# Patient Record
Sex: MALE | Race: WHITE | Employment: OTHER | URBAN - METROPOLITAN AREA
[De-identification: names, ages, dates, MRNs, and addresses within clinical notes are randomized per-mention and may not be internally consistent; named-entity substitution may affect disease eponyms.]

---

## 2017-08-30 ENCOUNTER — HOSPITAL ENCOUNTER (INPATIENT)
Age: 72
LOS: 6 days | Discharge: HOME | DRG: 309 | End: 2017-09-06
Attending: STUDENT IN AN ORGANIZED HEALTH CARE EDUCATION/TRAINING PROGRAM | Admitting: FAMILY MEDICINE
Payer: MEDICARE

## 2017-08-30 ENCOUNTER — APPOINTMENT (OUTPATIENT)
Dept: GENERAL RADIOLOGY | Age: 72
DRG: 309 | End: 2017-08-30
Attending: STUDENT IN AN ORGANIZED HEALTH CARE EDUCATION/TRAINING PROGRAM
Payer: MEDICARE

## 2017-08-30 DIAGNOSIS — R26.81 GAIT INSTABILITY: ICD-10-CM

## 2017-08-30 DIAGNOSIS — R42 ORTHOSTATIC DIZZINESS: Primary | ICD-10-CM

## 2017-08-30 LAB
ALBUMIN SERPL-MCNC: 3.3 G/DL (ref 3.5–5)
ALBUMIN/GLOB SERPL: 0.8 {RATIO} (ref 1.1–2.2)
ALP SERPL-CCNC: 90 U/L (ref 45–117)
ALT SERPL-CCNC: 17 U/L (ref 12–78)
ANION GAP SERPL CALC-SCNC: 7 MMOL/L (ref 5–15)
APPEARANCE UR: CLEAR
AST SERPL-CCNC: 34 U/L (ref 15–37)
BACTERIA URNS QL MICRO: NEGATIVE /HPF
BASOPHILS # BLD: 0 K/UL (ref 0–0.1)
BASOPHILS NFR BLD: 0 % (ref 0–1)
BILIRUB SERPL-MCNC: 0.4 MG/DL (ref 0.2–1)
BILIRUB UR QL: NEGATIVE
BUN SERPL-MCNC: 15 MG/DL (ref 6–20)
BUN/CREAT SERPL: 13 (ref 12–20)
CALCIUM SERPL-MCNC: 7.7 MG/DL (ref 8.5–10.1)
CHLORIDE SERPL-SCNC: 107 MMOL/L (ref 97–108)
CK SERPL-CCNC: 114 U/L (ref 39–308)
CO2 SERPL-SCNC: 24 MMOL/L (ref 21–32)
COLOR UR: ABNORMAL
CREAT SERPL-MCNC: 1.19 MG/DL (ref 0.7–1.3)
EOSINOPHIL # BLD: 0.5 K/UL (ref 0–0.4)
EOSINOPHIL NFR BLD: 8 % (ref 0–7)
EPITH CASTS URNS QL MICRO: ABNORMAL /LPF
ERYTHROCYTE [DISTWIDTH] IN BLOOD BY AUTOMATED COUNT: 14.8 % (ref 11.5–14.5)
GLOBULIN SER CALC-MCNC: 4.4 G/DL (ref 2–4)
GLUCOSE SERPL-MCNC: 91 MG/DL (ref 65–100)
GLUCOSE UR STRIP.AUTO-MCNC: NEGATIVE MG/DL
HCT VFR BLD AUTO: 39.2 % (ref 36.6–50.3)
HGB BLD-MCNC: 12.9 G/DL (ref 12.1–17)
HGB UR QL STRIP: ABNORMAL
KETONES UR QL STRIP.AUTO: NEGATIVE MG/DL
LACTATE SERPL-SCNC: 1.5 MMOL/L (ref 0.4–2)
LEUKOCYTE ESTERASE UR QL STRIP.AUTO: NEGATIVE
LYMPHOCYTES # BLD: 1.7 K/UL (ref 0.8–3.5)
LYMPHOCYTES NFR BLD: 28 % (ref 12–49)
MCH RBC QN AUTO: 30.8 PG (ref 26–34)
MCHC RBC AUTO-ENTMCNC: 32.9 G/DL (ref 30–36.5)
MCV RBC AUTO: 93.6 FL (ref 80–99)
MONOCYTES # BLD: 0.7 K/UL (ref 0–1)
MONOCYTES NFR BLD: 11 % (ref 5–13)
NEUTS SEG # BLD: 3.2 K/UL (ref 1.8–8)
NEUTS SEG NFR BLD: 53 % (ref 32–75)
NITRITE UR QL STRIP.AUTO: NEGATIVE
PH UR STRIP: 5.5 [PH] (ref 5–8)
PLATELET # BLD AUTO: 181 K/UL (ref 150–400)
POTASSIUM SERPL-SCNC: 5.2 MMOL/L (ref 3.5–5.1)
PROT SERPL-MCNC: 7.7 G/DL (ref 6.4–8.2)
PROT UR STRIP-MCNC: NEGATIVE MG/DL
RBC # BLD AUTO: 4.19 M/UL (ref 4.1–5.7)
RBC #/AREA URNS HPF: ABNORMAL /HPF (ref 0–5)
SODIUM SERPL-SCNC: 138 MMOL/L (ref 136–145)
SP GR UR REFRACTOMETRY: 1.01 (ref 1–1.03)
TROPONIN I SERPL-MCNC: <0.04 NG/ML
UR CULT HOLD, URHOLD: NORMAL
UROBILINOGEN UR QL STRIP.AUTO: 0.2 EU/DL (ref 0.2–1)
WBC # BLD AUTO: 6.1 K/UL (ref 4.1–11.1)
WBC URNS QL MICRO: ABNORMAL /HPF (ref 0–4)

## 2017-08-30 PROCEDURE — 99285 EMERGENCY DEPT VISIT HI MDM: CPT

## 2017-08-30 PROCEDURE — 83880 ASSAY OF NATRIURETIC PEPTIDE: CPT | Performed by: FAMILY MEDICINE

## 2017-08-30 PROCEDURE — 36415 COLL VENOUS BLD VENIPUNCTURE: CPT | Performed by: STUDENT IN AN ORGANIZED HEALTH CARE EDUCATION/TRAINING PROGRAM

## 2017-08-30 PROCEDURE — 83605 ASSAY OF LACTIC ACID: CPT | Performed by: STUDENT IN AN ORGANIZED HEALTH CARE EDUCATION/TRAINING PROGRAM

## 2017-08-30 PROCEDURE — 71010 XR CHEST PORT: CPT

## 2017-08-30 PROCEDURE — 93005 ELECTROCARDIOGRAM TRACING: CPT

## 2017-08-30 PROCEDURE — 82550 ASSAY OF CK (CPK): CPT | Performed by: STUDENT IN AN ORGANIZED HEALTH CARE EDUCATION/TRAINING PROGRAM

## 2017-08-30 PROCEDURE — 87040 BLOOD CULTURE FOR BACTERIA: CPT | Performed by: STUDENT IN AN ORGANIZED HEALTH CARE EDUCATION/TRAINING PROGRAM

## 2017-08-30 PROCEDURE — 80053 COMPREHEN METABOLIC PANEL: CPT | Performed by: STUDENT IN AN ORGANIZED HEALTH CARE EDUCATION/TRAINING PROGRAM

## 2017-08-30 PROCEDURE — 84484 ASSAY OF TROPONIN QUANT: CPT | Performed by: STUDENT IN AN ORGANIZED HEALTH CARE EDUCATION/TRAINING PROGRAM

## 2017-08-30 PROCEDURE — 81001 URINALYSIS AUTO W/SCOPE: CPT | Performed by: STUDENT IN AN ORGANIZED HEALTH CARE EDUCATION/TRAINING PROGRAM

## 2017-08-30 PROCEDURE — 85025 COMPLETE CBC W/AUTO DIFF WBC: CPT | Performed by: STUDENT IN AN ORGANIZED HEALTH CARE EDUCATION/TRAINING PROGRAM

## 2017-08-30 NOTE — IP AVS SNAPSHOT
[address] [facility] [address] 
[phone] Patient: Meri Query MRN: [id number] LHX:4/48/0162 You are allergic to the following Allergen Reactions Latex Rash Dilantin (Phenytoin Sodium Extended) Other (comments) \"makes me violent\" Iodinated Contrast- Oral And Iv Dye Rash Morphine Other (comments) \"makes me violent\" Pcn (Penicillins) Rash  
    
 Sulfa (Sulfonamide Antibiotics) Rash Recent Documentation Height Weight BMI Smoking Status 1.88 m 65.7 kg 18.59 kg/m2 Former Smoker Unresulted Labs Order Current Status GM1 ANTIBODY IGG, IGM Preliminary result PARANEOPLASTIC ABS W/REFLEX Preliminary result Emergency Contacts Name Discharge Info Relation Home Work Mobile [employer] [address] [employer] [3] Spouse [3] [phone] About your hospitalization You were admitted on:  August 31, 2017 You last received care in the:  [address] You were discharged on:  September 6, 2017 Unit phone number:  [phone] Why you were hospitalized Your primary diagnosis was:  Orthostatic Hypotension Your diagnoses also included:  Acute Hyperkalemia, Pulmonary Edema, Gait Instability Providers Seen During Your Hospitalizations Provider Role Specialty Primary office phone Evan Ayala MD Attending Provider Emergency Medicine [phone] Solo Rollins MD Attending Provider [facility] [phone] Opal Busch MD Attending Provider [facility] [phone] Shireen Mo MD Attending Provider Internal Medicine [phone] Dewain Simmonds, MD Attending Provider Internal Medicine [phone] Your Primary Care Physician (PCP) Primary Care Physician Office Phone Office Fax Zenobia Song [phone] Follow-up Information Follow up With Details Comments Contact Info Rolan Smart MD In 2 weeks Discharge follow up  [address] [phone] Cammie Hilton MD In 2 weeks discharge follow up  [address] [place] [address] 
[phone] Current Discharge Medication List  
  
CONTINUE these medications which have CHANGED Dose & Instructions Dispensing Information Comments Morning Noon Evening Bedtime  
 amiodarone 200 mg tablet Commonly known as:  CORDARONE What changed:  when to take this Your last dose was: Your next dose is:    
   
   
 Dose:  200 mg Take 1 Tab by mouth two (2) times a day. Quantity:  60 Tab Refills:  1  
     
   
   
   
  
 * fludrocortisone 0.1 mg tablet Commonly known as:  FLORINEF What changed: You were already taking a medication with the same name, and this prescription was added. Make sure you understand how and when to take each. Your last dose was: Your next dose is:    
   
   
 Dose:  0.2 mg Take 2 Tabs by mouth every evening. Quantity:  30 Tab Refills:  1  
     
   
   
   
  
 * fludrocortisone 0.1 mg tablet Commonly known as:  FLORINEF What changed:  how much to take Your last dose was: Your next dose is:    
   
   
 Dose:  0.4 mg Take 4 Tabs by mouth daily. Quantity:  120 Tab Refills:  1  
     
   
   
   
  
 midodrine 10 mg tablet Commonly known as:  Hammad Brooks What changed:  how much to take Your last dose was: Your next dose is:    
   
   
 Dose:  15 mg Take 1.5 Tabs by mouth three (3) times daily. Quantity:  130 Tab Refills:  1  
     
   
   
   
  
 * Notice: This list has 2 medication(s) that are the same as other medications prescribed for you. Read the directions carefully, and ask your doctor or other care provider to review them with you. CONTINUE these medications which have NOT CHANGED Dose & Instructions Dispensing Information Comments Morning Noon Evening Bedtime  
 aspirin delayed-release 81 mg tablet Your last dose was: Your next dose is:    
   
   
 Dose:  81 mg Take 81 mg by mouth daily. Refills:  0  
     
   
   
   
  
 cyanocobalamin 1,000 mcg tablet Your last dose was: Your next dose is:    
   
   
 Dose:  2000 mcg Take 2,000 mcg by mouth daily. Refills:  0  
     
   
   
   
  
 levothyroxine 150 mcg tablet Commonly known as:  SYNTHROID Your last dose was: Your next dose is:    
   
   
 Dose:  150 mcg Take 150 mcg by mouth Daily (before breakfast). Refills:  0  
     
   
   
   
  
 nitroglycerin 0.4 mg SL tablet Commonly known as:  NITROSTAT Your last dose was: Your next dose is:    
   
   
 Dose:  0.4 mg  
0.4 mg by SubLINGual route every five (5) minutes as needed for Chest Pain. Refills:  0  
     
   
   
   
  
 primidone 250 mg tablet Commonly known as: MYSOLINE Your last dose was: Your next dose is:    
   
   
 Dose:  250 mg Take 250 mg by mouth two (2) times a day. Refills:  0  
     
   
   
   
  
 tamsulosin 0.4 mg capsule Commonly known as:  FLOMAX Your last dose was: Your next dose is:    
   
   
 Dose:  0.4 mg Take 0.4 mg by mouth nightly. Refills:  0 Where to Get Your Medications Information on where to get these meds will be given to you by the nurse or doctor. ! Ask your nurse or doctor about these medications  
  amiodarone 200 mg tablet  
 fludrocortisone 0.1 mg tablet  
 fludrocortisone 0.1 mg tablet  
 midodrine 10 mg tablet Discharge Instructions Please bring this form with you to show your primary care provider at your follow-up appointment.  
 
Primary care provider:  Dr. Bernard Moon MD 
 
Discharging provider:  Sanchez Urrutia MD 
 
 You have been admitted to the hospital with the following diagnoses: · Acute hyperkalemia · Inability to walk · Hypotension · Pulmonary edema · Orthostatic hypotension FOLLOW-UP CARE RECOMMENDATIONS: 
 
APPOINTMENTS: 
Follow-up Information Follow up With Details Comments Contact Info Simon Aranda MD In 2 weeks Discharge follow up  [address] [id number] 
[phone] Dax Guillory MD In 2 weeks discharge follow up  [address] [facility] 
[phone] FOLLOW-UP TESTS recommended: none PENDING TEST RESULTS: 
At the time of your discharge the following test results are still pending: none Please make sure you review these results with your outpatient follow-up provider(s). SYMPTOMS to watch for: chest pain, shortness of breath, fever, chills, nausea, vomiting, diarrhea, change in mentation, falling, weakness, bleeding. DIET/what to eat:  Regular Diet ACTIVITY:  Activity as tolerated with Outpatient PT/OT 
 
WOUND CARE: none EQUIPMENT needed:  none What to do if new or unexpected symptoms occur? If you experience any of the above symptoms (or should other concerns or questions arise after discharge) please call your primary care physician. Return to the emergency room if you cannot get hold of your doctor. · It is very important that you keep your follow-up appointment(s). · Please bring discharge papers, medication list (and/or medication bottles) to your follow-up appointments for review by your outpatient provider(s). · Please check the list of medications and be sure it includes every medication (even non-prescription medications) that your provider wants you to take. · It is important that you take the medication exactly as they are prescribed. · Keep your medication in the bottles provided by the pharmacist and keep a list of the medication names, dosages, and times to be taken in your wallet. · Do not take other medications without consulting your doctor. · If you have any questions about your medications or other instructions, please talk to your nurse or care provider before you leave the hospital. 
 
I understand that if any problems occur once I am at home I am to contact my physician. These instructions were explained to me and I had the opportunity to ask questions. Discharge Orders None [employer] Announcement We are excited to announce that we are making your provider's discharge notes available to you in Y-Klub. You will see these notes when they are completed and signed by the physician that discharged you from your recent hospital stay. If you have any questions or concerns about any information you see in [employer], please call the Health Information Department where you were seen or reach out to your Primary Care Provider for more information about your plan of care. Introducing [facility]! [facility] introduces Y-Klub patient portal. Now you can access parts of your medical record, email your doctor's office, and request medication refills online. 1. In your internet browser, go to [email or web address] 2. Click on the First Time User? Click Here link in the Sign In box. You will see the New Member Sign Up page. 3. Enter your Y-Klub Access Code exactly as it appears below. You will not need to use this code after youve completed the sign-up process. If you do not sign up before the expiration date, you must request a new code. · Y-Klub Access Code: [email or web address] Expires: 11/29/2017 10:18 AM 
 
4. Enter the last four digits of your Social Security Number (xxxx) and Date of Birth (mm/dd/yyyy) as indicated and click Submit. You will be taken to the next sign-up page. 5. Create a Y-Klub ID. This will be your Y-Klub login ID and cannot be changed, so think of one that is secure and easy to remember. 6. Create a Signaturit password. You can change your password at any time. 7. Enter your Password Reset Question and Answer. This can be used at a later time if you forget your password. 8. Enter your e-mail address. You will receive e-mail notification when new information is available in [address] 9. Click Sign Up. You can now view and download portions of your medical record. 10. Click the Download Summary menu link to download a portable copy of your medical information. If you have questions, please visit the Frequently Asked Questions section of the Signaturit website. Remember, Signaturit is NOT to be used for urgent needs. For medical emergencies, dial 911. Now available from your iPhone and Android! General Information Please provide this summary of care documentation to your next provider. Patient Signature:  ____________________________________________________________ Date:  ____________________________________________________________  
  
Jose Up Provider Signature:  ____________________________________________________________ Date:  ____________________________________________________________

## 2017-08-30 NOTE — IP AVS SNAPSHOT
[address] [address] 
[phone] Patient: Laurie Hernandez MRN: [id number] OBP:7[birth year]18[birth year]73 Current Discharge Medication List  
  
CONTINUE these medications which have CHANGED Dose & Instructions Dispensing Information Comments Morning Noon Evening Bedtime  
 amiodarone 200 mg tablet Commonly known as:  CORDARONE What changed:  when to take this Your last dose was: Your next dose is:    
   
   
 Dose:  200 mg Take 1 Tab by mouth two (2) times a day. Quantity:  60 Tab Refills:  1  
     
   
   
   
  
 * fludrocortisone 0.1 mg tablet Commonly known as:  FLORINEF What changed: You were already taking a medication with the same name, and this prescription was added. Make sure you understand how and when to take each. Your last dose was: Your next dose is:    
   
   
 Dose:  0.2 mg Take 2 Tabs by mouth every evening. Quantity:  30 Tab Refills:  1  
     
   
   
   
  
 * fludrocortisone 0.1 mg tablet Commonly known as:  FLORINEF What changed:  how much to take Your last dose was: Your next dose is:    
   
   
 Dose:  0.4 mg Take 4 Tabs by mouth daily. Quantity:  120 Tab Refills:  1  
     
   
   
   
  
 midodrine 10 mg tablet Commonly known as:  Lilian Pyle What changed:  how much to take Your last dose was: Your next dose is:    
   
   
 Dose:  15 mg Take 1.5 Tabs by mouth three (3) times daily. Quantity:  130 Tab Refills:  1  
     
   
   
   
  
 * Notice: This list has 2 medication(s) that are the same as other medications prescribed for you. Read the directions carefully, and ask your doctor or other care provider to review them with you. CONTINUE these medications which have NOT CHANGED Dose & Instructions Dispensing Information Comments Morning Noon Evening Bedtime  
 aspirin delayed-release 81 mg tablet Your last dose was: Your next dose is:    
   
   
 Dose:  81 mg Take 81 mg by mouth daily. Refills:  0  
     
   
   
   
  
 cyanocobalamin 1,000 mcg tablet Your last dose was: Your next dose is:    
   
   
 Dose:  2000 mcg Take 2,000 mcg by mouth daily. Refills:  0  
     
   
   
   
  
 levothyroxine 150 mcg tablet Commonly known as:  SYNTHROID Your last dose was: Your next dose is:    
   
   
 Dose:  150 mcg Take 150 mcg by mouth Daily (before breakfast). Refills:  0  
     
   
   
   
  
 nitroglycerin 0.4 mg SL tablet Commonly known as:  NITROSTAT Your last dose was: Your next dose is:    
   
   
 Dose:  0.4 mg  
0.4 mg by SubLINGual route every five (5) minutes as needed for Chest Pain. Refills:  0  
     
   
   
   
  
 primidone 250 mg tablet Commonly known as: MYSOLINE Your last dose was: Your next dose is:    
   
   
 Dose:  250 mg Take 250 mg by mouth two (2) times a day. Refills:  0  
     
   
   
   
  
 tamsulosin 0.4 mg capsule Commonly known as:  FLOMAX Your last dose was: Your next dose is:    
   
   
 Dose:  0.4 mg Take 0.4 mg by mouth nightly. Refills:  0 Where to Get Your Medications Information on where to get these meds will be given to you by the nurse or doctor. ! Ask your nurse or doctor about these medications  
  amiodarone 200 mg tablet  
 fludrocortisone 0.1 mg tablet  
 fludrocortisone 0.1 mg tablet  
 midodrine 10 mg tablet

## 2017-08-30 NOTE — ED TRIAGE NOTES
Triage note: pt arrives via EMS from PCP office. Pt has known orthostatic hypotension. Pt had low bp at PCP office and received 2 L NS with no improvement in BP.  Pt is alert and oriented on arrival.

## 2017-08-31 PROBLEM — R26.2 INABILITY TO WALK: Status: ACTIVE | Noted: 2017-08-31

## 2017-08-31 PROBLEM — I95.9 HYPOTENSION: Status: ACTIVE | Noted: 2017-08-31

## 2017-08-31 PROBLEM — I95.1 ORTHOSTATIC HYPOTENSION: Status: ACTIVE | Noted: 2017-08-31

## 2017-08-31 PROBLEM — R26.81 GAIT INSTABILITY: Status: ACTIVE | Noted: 2017-08-31

## 2017-08-31 PROBLEM — J81.1 PULMONARY EDEMA: Status: ACTIVE | Noted: 2017-08-31

## 2017-08-31 PROBLEM — E87.5 ACUTE HYPERKALEMIA: Status: ACTIVE | Noted: 2017-08-31

## 2017-08-31 LAB
ANION GAP SERPL CALC-SCNC: 7 MMOL/L (ref 5–15)
ATRIAL RATE: 87 BPM
BNP SERPL-MCNC: 1053 PG/ML (ref 0–125)
BUN SERPL-MCNC: 12 MG/DL (ref 6–20)
BUN/CREAT SERPL: 12 (ref 12–20)
CALCIUM SERPL-MCNC: 7.5 MG/DL (ref 8.5–10.1)
CALCULATED R AXIS, ECG10: 65 DEGREES
CALCULATED T AXIS, ECG11: 138 DEGREES
CHLORIDE SERPL-SCNC: 112 MMOL/L (ref 97–108)
CO2 SERPL-SCNC: 27 MMOL/L (ref 21–32)
CREAT SERPL-MCNC: 1.01 MG/DL (ref 0.7–1.3)
DIAGNOSIS, 93000: NORMAL
GLUCOSE SERPL-MCNC: 96 MG/DL (ref 65–100)
POTASSIUM SERPL-SCNC: 3.5 MMOL/L (ref 3.5–5.1)
Q-T INTERVAL, ECG07: 404 MS
QRS DURATION, ECG06: 138 MS
QTC CALCULATION (BEZET), ECG08: 488 MS
SODIUM SERPL-SCNC: 146 MMOL/L (ref 136–145)
TSH SERPL DL<=0.05 MIU/L-ACNC: 0.79 UIU/ML (ref 0.36–3.74)
VENTRICULAR RATE, ECG03: 88 BPM
VIT B12 SERPL-MCNC: 457 PG/ML (ref 211–911)

## 2017-08-31 PROCEDURE — 82784 ASSAY IGA/IGD/IGG/IGM EACH: CPT | Performed by: PSYCHIATRY & NEUROLOGY

## 2017-08-31 PROCEDURE — 82525 ASSAY OF COPPER: CPT | Performed by: PSYCHIATRY & NEUROLOGY

## 2017-08-31 PROCEDURE — G8979 MOBILITY GOAL STATUS: HCPCS

## 2017-08-31 PROCEDURE — G8987 SELF CARE CURRENT STATUS: HCPCS

## 2017-08-31 PROCEDURE — 74011250637 HC RX REV CODE- 250/637: Performed by: NURSE PRACTITIONER

## 2017-08-31 PROCEDURE — 97530 THERAPEUTIC ACTIVITIES: CPT

## 2017-08-31 PROCEDURE — 97161 PT EVAL LOW COMPLEX 20 MIN: CPT

## 2017-08-31 PROCEDURE — 74011250636 HC RX REV CODE- 250/636: Performed by: FAMILY MEDICINE

## 2017-08-31 PROCEDURE — 80048 BASIC METABOLIC PNL TOTAL CA: CPT | Performed by: FAMILY MEDICINE

## 2017-08-31 PROCEDURE — 82607 VITAMIN B-12: CPT | Performed by: PSYCHIATRY & NEUROLOGY

## 2017-08-31 PROCEDURE — 74011250637 HC RX REV CODE- 250/637: Performed by: FAMILY MEDICINE

## 2017-08-31 PROCEDURE — 99218 HC RM OBSERVATION: CPT

## 2017-08-31 PROCEDURE — 36415 COLL VENOUS BLD VENIPUNCTURE: CPT | Performed by: PSYCHIATRY & NEUROLOGY

## 2017-08-31 PROCEDURE — 65270000032 HC RM SEMIPRIVATE

## 2017-08-31 PROCEDURE — G8978 MOBILITY CURRENT STATUS: HCPCS

## 2017-08-31 PROCEDURE — 84443 ASSAY THYROID STIM HORMONE: CPT | Performed by: NURSE PRACTITIONER

## 2017-08-31 PROCEDURE — 93306 TTE W/DOPPLER COMPLETE: CPT

## 2017-08-31 PROCEDURE — 86335 IMMUNFIX E-PHORSIS/URINE/CSF: CPT | Performed by: PSYCHIATRY & NEUROLOGY

## 2017-08-31 PROCEDURE — G8988 SELF CARE GOAL STATUS: HCPCS

## 2017-08-31 PROCEDURE — 97165 OT EVAL LOW COMPLEX 30 MIN: CPT

## 2017-08-31 RX ORDER — MIDODRINE HYDROCHLORIDE 5 MG/1
10 TABLET ORAL 3 TIMES DAILY
Status: DISCONTINUED | OUTPATIENT
Start: 2017-08-31 | End: 2017-08-31

## 2017-08-31 RX ORDER — PRIMIDONE 250 MG/1
250 TABLET ORAL 2 TIMES DAILY
Status: DISCONTINUED | OUTPATIENT
Start: 2017-08-31 | End: 2017-09-06 | Stop reason: HOSPADM

## 2017-08-31 RX ORDER — SODIUM POLYSTYRENE SULFONATE 15 G/60ML
45 SUSPENSION ORAL; RECTAL
Status: COMPLETED | OUTPATIENT
Start: 2017-08-31 | End: 2017-08-31

## 2017-08-31 RX ORDER — HYDROCORTISONE 1 %
CREAM (GRAM) TOPICAL
Status: DISCONTINUED | OUTPATIENT
Start: 2017-08-31 | End: 2017-09-06 | Stop reason: HOSPADM

## 2017-08-31 RX ORDER — ASPIRIN 81 MG/1
81 TABLET ORAL DAILY
Status: DISCONTINUED | OUTPATIENT
Start: 2017-08-31 | End: 2017-09-06 | Stop reason: HOSPADM

## 2017-08-31 RX ORDER — LEVOTHYROXINE SODIUM 150 UG/1
150 TABLET ORAL
Status: DISCONTINUED | OUTPATIENT
Start: 2017-08-31 | End: 2017-09-06 | Stop reason: HOSPADM

## 2017-08-31 RX ORDER — LANOLIN ALCOHOL/MO/W.PET/CERES
2000 CREAM (GRAM) TOPICAL DAILY
Status: DISCONTINUED | OUTPATIENT
Start: 2017-08-31 | End: 2017-09-06 | Stop reason: HOSPADM

## 2017-08-31 RX ORDER — SODIUM CHLORIDE 9 MG/ML
125 INJECTION, SOLUTION INTRAVENOUS CONTINUOUS
Status: DISCONTINUED | OUTPATIENT
Start: 2017-08-31 | End: 2017-08-31

## 2017-08-31 RX ORDER — TAMSULOSIN HYDROCHLORIDE 0.4 MG/1
0.4 CAPSULE ORAL
Status: DISCONTINUED | OUTPATIENT
Start: 2017-08-31 | End: 2017-08-31

## 2017-08-31 RX ORDER — FLUDROCORTISONE ACETATE 0.1 MG/1
100 TABLET ORAL
Status: COMPLETED | OUTPATIENT
Start: 2017-08-31 | End: 2017-08-31

## 2017-08-31 RX ORDER — SODIUM CHLORIDE 0.9 % (FLUSH) 0.9 %
5-10 SYRINGE (ML) INJECTION AS NEEDED
Status: DISCONTINUED | OUTPATIENT
Start: 2017-08-31 | End: 2017-09-06 | Stop reason: HOSPADM

## 2017-08-31 RX ORDER — FLUDROCORTISONE ACETATE 0.1 MG/1
0.2 TABLET ORAL DAILY
Status: DISCONTINUED | OUTPATIENT
Start: 2017-08-31 | End: 2017-08-31

## 2017-08-31 RX ORDER — SODIUM CHLORIDE 0.9 % (FLUSH) 0.9 %
5-10 SYRINGE (ML) INJECTION EVERY 8 HOURS
Status: DISCONTINUED | OUTPATIENT
Start: 2017-08-31 | End: 2017-09-06 | Stop reason: HOSPADM

## 2017-08-31 RX ORDER — PRIMIDONE 250 MG/1
250 TABLET ORAL 2 TIMES DAILY
Status: DISCONTINUED | OUTPATIENT
Start: 2017-08-31 | End: 2017-08-31

## 2017-08-31 RX ORDER — FLUDROCORTISONE ACETATE 0.1 MG/1
0.3 TABLET ORAL DAILY
Status: DISCONTINUED | OUTPATIENT
Start: 2017-09-01 | End: 2017-09-01

## 2017-08-31 RX ORDER — MIDODRINE HYDROCHLORIDE 5 MG/1
10 TABLET ORAL
Status: DISCONTINUED | OUTPATIENT
Start: 2017-08-31 | End: 2017-09-05

## 2017-08-31 RX ADMIN — SODIUM POLYSTYRENE SULFONATE 45 G: 15 SUSPENSION ORAL; RECTAL at 00:50

## 2017-08-31 RX ADMIN — FLUDROCORTISONE ACETATE 200 MCG: 0.1 TABLET ORAL at 08:39

## 2017-08-31 RX ADMIN — Medication 10 ML: at 06:43

## 2017-08-31 RX ADMIN — LEVOTHYROXINE SODIUM 150 MCG: 150 TABLET ORAL at 06:44

## 2017-08-31 RX ADMIN — Medication 10 ML: at 17:39

## 2017-08-31 RX ADMIN — ASPIRIN 81 MG: 81 TABLET, COATED ORAL at 08:39

## 2017-08-31 RX ADMIN — PRIMIDONE 250 MG: 250 TABLET ORAL at 17:39

## 2017-08-31 RX ADMIN — SODIUM CHLORIDE 125 ML/HR: 900 INJECTION, SOLUTION INTRAVENOUS at 08:38

## 2017-08-31 RX ADMIN — SODIUM CHLORIDE 125 ML/HR: 900 INJECTION, SOLUTION INTRAVENOUS at 00:53

## 2017-08-31 RX ADMIN — MIDODRINE HYDROCHLORIDE 10 MG: 5 TABLET ORAL at 13:57

## 2017-08-31 RX ADMIN — MIDODRINE HYDROCHLORIDE 10 MG: 5 TABLET ORAL at 17:39

## 2017-08-31 RX ADMIN — FLUDROCORTISONE ACETATE 100 MCG: 0.1 TABLET ORAL at 13:54

## 2017-08-31 RX ADMIN — PRIMIDONE 250 MG: 250 TABLET ORAL at 03:52

## 2017-08-31 RX ADMIN — MIDODRINE HYDROCHLORIDE 10 MG: 5 TABLET ORAL at 08:39

## 2017-08-31 RX ADMIN — TAMSULOSIN HYDROCHLORIDE 0.4 MG: 0.4 CAPSULE ORAL at 06:43

## 2017-08-31 NOTE — ED NOTES
Bedside shift change report given to Linn Ridley RN (oncoming nurse) by Precious Saab RN (offgoing nurse). Report included the following information SBAR, ED Summary, Intake/Output, MAR and Recent Results.

## 2017-08-31 NOTE — PROGRESS NOTES
Problem: Mobility Impaired (Adult and Pediatric)  Goal: *Acute Goals and Plan of Care (Insert Text)  Physical Therapy Goals  Initiated 8/31/2017  1. Patient will move from supine to sit and sit to supine in bed with independence within 7 day(s). 2. Patient will transfer from bed to chair and chair to bed with independence using the least restrictive device within 7 day(s). 3. Patient will perform sit to stand with independence within 7 day(s). 4. Patient will ambulate with independence for 250 feet with the least restrictive device within 7 day(s). 5. Patient will ascend/descend 5 stairs with 1 handrail(s) with independence within 7 day(s). PHYSICAL THERAPY EVALUATION  Patient: Erven Primrose (20 y.o. male)  Date: 8/31/2017  Primary Diagnosis: Acute hyperkalemia  Inability to walk  Hypotension  Pulmonary edema        Precautions:   Fall (h/o OH, pacemaker, tremor)      ASSESSMENT :  Based on the objective data described below, the patient presents with dizziness at rest and with positional changes, h/o +OH, afib, CVA, HF impaired dynamic standing balance, generally decreased functional strength and activity tolerance, limiting his independence and safety with ADL routine. Pt alert and oriented x4. Pt is overall supervision-Jamie for functional mobility greatly limited by dizziness with position changes particularly in standing. Pt supervision bed mobility, CGA-Jamie for transfer with A x 2 for safety d/t history of syncope. Please see doc flow for BP details. Ambulated in hallway with HHA x 1 as standing BP was initially 121/72. (drop from 132/76 in supine and 142/79 in sitting). Occasional LOB and increased trunk sway while walking. Deferred further gait training today for safety concerns and not having a second person for assistance. Returned to room and BP in standing 94/62. Pt did not c/o worsening in dizziness while ambulating however is positive for OH.   Pt reports he is typically IND-Angelique with ADLs, lives with wife, however, does have his \"bad days of dizziness\" and his wife limits the amount he can do. Education provided on impact of OH and daily ADLs, compensatory strategies and changing positions slowly. Pt may benefit from compression stockings/abdominal binder d/t h/o OH and dizziness. Anticipate once BP stable and symptoms resolve, return home with wife. .     Patient will benefit from skilled intervention to address the above impairments. Patients rehabilitation potential is considered to be Fair  Factors which may influence rehabilitation potential include:   [ ]         None noted  [ ]         Mental ability/status  [X]         Medical condition  [ ]         Home/family situation and support systems  [ ]         Safety awareness  [ ]         Pain tolerance/management  [ ]         Other:        PLAN :  Recommendations and Planned Interventions:  [X]           Bed Mobility Training             [X]    Neuromuscular Re-Education  [X]           Transfer Training                   [ ]    Orthotic/Prosthetic Training  [X]           Gait Training                         [ ]    Modalities  [X]           Therapeutic Exercises           [ ]    Edema Management/Control  [X]           Therapeutic Activities            [X]    Patient and Family Training/Education  [ ]           Other (comment):     Frequency/Duration: Patient will be followed by physical therapy  5 times a week to address goals. Discharge Recommendations: To Be Determined  Further Equipment Recommendations for Discharge: TBD       SUBJECTIVE:   Patient stated It's all because the nurse 6 years ago changed my medicine. I used to hunt and hike and now I can;t do that. Liset Lainez      OBJECTIVE DATA SUMMARY:   HISTORY:    Past Medical History:   Diagnosis Date    [facility]      Atrial fibrillation [facility]      Colonic polyp      Diverticulosis      [facility]      Heart murmur      Hemorrhoid      [facility]      Orthostatic hypotension      Rectal bleeding      Stroke (HCC)      Tremor      UTI (urinary tract infection)       Past Surgical History:   Procedure Laterality Date    HX IMPLANTABLE CARDIOVERTER DEFIBRILLATOR        HX PACEMAKER         Prior Level of Function/Home Situation: Mod I to Indep. Lives with his wife in the [facility]. They have 3 dogs. Personal factors and/or comorbidities impacting plan of care: PMH, OH     Home Situation  Home Environment: Private residence  # Steps to Enter: 5  One/Two Story Residence: One story  Living Alone: No  Support Systems: Spouse/Significant Other/Partner  Patient Expects to be Discharged to[de-identified] Private residence  Current DME Used/Available at Home: Grab bars  Tub or Shower Type: Tub     EXAMINATION/PRESENTATION/DECISION MAKING:   Critical Behavior:  Neurologic State: Alert, Appropriate for age  Orientation Level: Oriented X4  Cognition: Follows commands, Appropriate safety awareness, Appropriate for age attention/concentration, Appropriate decision making  Safety/Judgement: Awareness of environment, Fall prevention, Good awareness of safety precautions, Home safety, Insight into deficits  Hearing: Auditory  Auditory Impairment: None     Range Of Motion:  AROM: Generally decreased, functional           PROM: Within functional limits           Strength:    Strength: Generally decreased, functional                    Tone & Sensation:   Tone: Normal              Sensation: Intact               Coordination:  Coordination: Generally decreased, functional  Vision:   Tracking: Able to track stimulus in all quadrants w/o difficulty  Diplopia: No  Acuity: Able to read clock/calendar on wall without difficulty; Able to read employee name badge without difficulty  Functional Mobility:  Bed Mobility:     Supine to Sit: Supervision  Sit to Supine: Supervision  Scooting: Contact guard assistance  Transfers:  Sit to Stand: Contact guard assistance;Assist x2  Stand to Sit: Contact guard assistance                       Balance:   Sitting: Intact  Standing: Impaired  Standing - Static: Fair  Standing - Dynamic : Poor  Ambulation/Gait Training:  Distance (ft): 80 Feet (ft)  Assistive Device: Gait belt; Other (comment) (HHA)  Ambulation - Level of Assistance: Minimal assistance;Assist x1        Gait Abnormalities: Trunk sway increased; Path deviations;Decreased step clearance        Base of Support: Narrowed     Speed/Dominique: Slow  Step Length: Right shortened;Left shortened           Functional Measure:  Tinetti test:      Sitting Balance: 1  Arises: 1  Attempts to Rise: 2  Immediate Standing Balance: 0  Standing Balance: 1  Nudged: 1  Eyes Closed: 1  Turn 360 Degrees - Continuous/Discontinuous: 1  Turn 360 Degrees - Steady/Unsteady: 0  Sitting Down: 1  Balance Score: 9  Indication of Gait: 1  R Step Length/Height: 1  L Step Length/Height: 1  R Foot Clearance: 1  L Foot Clearance: 1  Step Symmetry: 1  Step Continuity: 1  Path: 0  Trunk: 0  Walking Time: 0  Gait Score: 7  Total Score: 16         Tinetti Test and G-code impairment scale:  Percentage of Impairment CH     0%    CI     1-19% CJ     20-39% CK     40-59% CL     60-79% CM     80-99% CN      100%   Tinetti  Score 0-28 28 23-27 17-22 12-16 6-11 1-5 0          Tinetti Tool Score Risk of Falls  <19 = High Fall Risk  19-24 = Moderate Fall Risk  25-28 = Low Fall Risk  Tinetti ME. Performance-Oriented Assessment of Mobility Problems in Elderly Patients. [place] 66; [id number]. (Scoring Description: PT Bulletin Feb. 10, 1993)     Older adults: Rubia Vallejo et al, 2009; n = 1000 [facility] elderly evaluated with ABC, WYATT, ADL, and IADL)  · Mean WYATT score for males aged 69-68 years = 26.21(3.40)  · Mean WYATT score for females age 69-68 years = 25.16(4.30)  · Mean WYATT score for males over 80 years = 23.29(6.02)  · Mean WYATT score for females over 80 years = 17.20(8.32)            G codes:   In compliance with CMSs Claims Based Outcome Reporting, the following G-code set was chosen for this patient based on their primary functional limitation being treated: The outcome measure chosen to determine the severity of the functional limitation was the Tinetti with a score of 16/21 which was correlated with the impairment scale. · Mobility - Walking and Moving Around:               - CURRENT STATUS:    CK - 40%-59% impaired, limited or restricted               - GOAL STATUS:           CJ - 20%-39% impaired, limited or restricted               - D/C STATUS:                       ---------------To be determined---------------      Physical Therapy Evaluation Charge Determination   History Examination Presentation Decision-Making   MEDIUM  Complexity : 1-2 comorbidities / personal factors will impact the outcome/ POC  MEDIUM Complexity : 3 Standardized tests and measures addressing body structure, function, activity limitation and / or participation in recreation  LOW Complexity : Stable, uncomplicated  MEDIUM Complexity : FOTO score of 26-74      Based on the above components, the patient evaluation is determined to be of the following complexity level: LOW      Pain:  Pain Scale 1: Numeric (0 - 10)  Pain Intensity 1: 3              Activity Tolerance:   Good  Please refer to the flowsheet for vital signs taken during this treatment. After treatment:   [ ]         Patient left in no apparent distress sitting up in chair  [X]         Patient left in no apparent distress in bed  [X]         Call bell left within reach  [X]         Nursing notified  [ ]         Caregiver present  [ ]         Bed alarm activated      COMMUNICATION/EDUCATION:   The patients plan of care was discussed with: Registered Nurse.  [X]         Fall prevention education was provided and the patient/caregiver indicated understanding. [X]         Patient/family have participated as able in goal setting and plan of care.   [X]         Patient/family agree to work toward stated goals and plan of care. [ ]         Patient understands intent and goals of therapy, but is neutral about his/her participation. [ ]         Patient is unable to participate in goal setting and plan of care.      Thank you for this referral.  Lorie Betancur, PT   Time Calculation: 18 mins

## 2017-08-31 NOTE — PROGRESS NOTES
Hospitalist Progress Note  Magdaleno Tijerina NP  Office: [phone]  Cell: [phone] 7a-5p      Date of Service:  2017  NAME:  Hilary Walden  :  1945  MRN:  [id number]      Admission Summary:   67 yom with pmh of CAD, MI, afib, s/p PPM, PTCA, stent,heart failure, stroke, UTI, orthostatic hypotension, Hodgkin lymphoma s/p chemo, diverticulitis, hemorrhoids, heart murmur, GI bleed, tremors who present from Dr Nicky Aguilar [facility] nephrologist) for hypotension. BP dropped to 75/40 in office so he was sent to ED for further eval. Patient has h/o orthostatic hypotension for >4 yrs and has had extensive w/u with cardiology, neurology, and nephrology at [address] [place] [address]. Interval history / Subjective:    Patient continues to report dizziness while changing from sitting to standing position. Reports chronic chest pain at site of PPM but no shortness of breath or any other acute complaints.       Assessment & Plan:     Acute Hyperkalemia-resolved  -now wnl s/p Kayexalate     Orthostatic Hypotension  -stop Flomax  -continue home Midodrine  -increase Florinef to 0.3mg  -orthostatics +, s/p IVF-> stopped today given pulmonary edema on CXR on admission  -thigh high RODRIGO hose  -recheck orthostatics in am   -echo ef 55-60% no wall abnormalities  -attempted to contact patient's nephrologist who has been managing to discuss pt given this has been on going for 4 years however have not heard back      Hypothyroidism   -tsh wnl   -continue home levothyroxine    Difficulty with ambulating   -PT/OT consult    Resting tremor  -resume home meds    Code status: Full     Care Plan discussed with: Patient/Family and Nurse Dr Jens Leonard, Dr Jerry Phillips with neurology  Disposition: Home w/Family and TBD     Hospital Problems  Date Reviewed: 2017          Codes Class Noted POA    Acute hyperkalemia ICD-10-CM: E87.5  ICD-9-CM: 276.7  2017 Unknown        Pulmonary edema ICD-10-CM: J81.1  ICD-9-CM: 905  8/31/2017 Unknown        * (Principal)Orthostatic hypotension ICD-10-CM: I95.1  ICD-9-CM: 458.0  8/31/2017 Unknown        Gait instability ICD-10-CM: R26.81  ICD-9-CM: 781.2  8/31/2017 Unknown                Review of Systems:   A comprehensive review of systems was negative except for that written in the HPI. Vital Signs:    Last 24hrs VS reviewed since prior progress note. Most recent are:  Visit Vitals    /66 (BP 1 Location: Left arm, BP Patient Position: Sitting)    Pulse 98    Temp 97.8 °F (36.6 °C)    Resp 18    Ht 6' 2\" (1.88 m)    Wt 70.3 kg (155 lb)    SpO2 99%    BMI 19.9 kg/m2       No intake or output data in the 24 hours ending 08/31/17 1559     Physical Examination:             Constitutional:  No acute distress, cooperative, pleasant    ENT:  Oral mucous moist, oropharynx benign. Neck supple,    Resp:  CTA bilaterally. No wheezing/rhonchi/rales. No accessory muscle use   CV:  Regular rhythm, normal rate, no murmurs, gallops, rubs    GI:  Soft, non distended, non tender. normoactive bowel sounds, no hepatosplenomegaly     Musculoskeletal:  No edema, warm, 2+ pulses throughout    Neurologic:  Moves all extremities. AAOx3, CN II-XII reviewed     Psych:  Good insight, Not anxious nor agitated. Data Review:    Review and/or order of clinical lab test  Review and/or order of tests in the radiology section of CPT  Review and/or order of tests in the medicine section of CPT      Labs:     Recent Labs      08/30/17 1948   WBC  6.1   HGB  12.9   HCT  39.2   PLT  181     Recent Labs      08/31/17   0513  08/30/17 1948   NA  146*  138   K  3.5  5.2*   CL  112*  107   CO2  27  24   BUN  12  15   CREA  1.01  1.19   GLU  96  91   CA  7.5*  7.7*     Recent Labs      08/30/17 1948   SGOT  34   ALT  17   AP  90   TBILI  0.4   TP  7.7   ALB  3.3*   GLOB  4.4*     No results for input(s): INR, PTP, APTT in the last 72 hours.     No lab exists for component: INREXT, INREXT   No results for input(s): FE, TIBC, PSAT, FERR in the last 72 hours. No results found for: FOL, RBCF   No results for input(s): PH, PCO2, PO2 in the last 72 hours.   Recent Labs      08/30/17 1948   TROIQ  <0.04     No results found for: CHOL, CHOLX, CHLST, CHOLV, HDL, LDL, LDLC, DLDLP, TGLX, TRIGL, TRIGP, CHHD, CHHDX  No results found for: [facility]  Lab Results   Component Value Date/Time    Color YELLOW/STRAW 08/30/2017 09:14 PM    Appearance CLEAR 08/30/2017 09:14 PM    Specific gravity 1.012 08/30/2017 09:14 PM    pH (UA) 5.5 08/30/2017 09:14 PM    Protein NEGATIVE  08/30/2017 09:14 PM    Glucose NEGATIVE  08/30/2017 09:14 PM    Ketone NEGATIVE  08/30/2017 09:14 PM    Bilirubin NEGATIVE  08/30/2017 09:14 PM    Urobilinogen 0.2 08/30/2017 09:14 PM    Nitrites NEGATIVE  08/30/2017 09:14 PM    Leukocyte Esterase NEGATIVE  08/30/2017 09:14 PM    Epithelial cells FEW 08/30/2017 09:14 PM    Bacteria NEGATIVE  08/30/2017 09:14 PM    WBC 0-4 08/30/2017 09:14 PM    RBC 0-5 08/30/2017 09:14 PM         Medications Reviewed:     Current Facility-Administered Medications   Medication Dose Route Frequency    sodium chloride (NS) flush 5-10 mL  5-10 mL IntraVENous Q8H    sodium chloride (NS) flush 5-10 mL  5-10 mL IntraVENous PRN    aspirin delayed-release tablet 81 mg  81 mg Oral DAILY    levothyroxine (SYNTHROID) tablet 150 mcg  150 mcg Oral ACB    cyanocobalamin (VITAMIN B12) tablet 2,000 mcg  2,000 mcg Oral DAILY    primidone (MYSOLINE) tablet 250 mg  250 mg Oral BID    hydrocortisone (CORTAID) 1 % cream   Topical TID PRN    [START ON 9/1/2017] fludrocortisone (FLORINEF) tablet 300 mcg  0.3 mg Oral DAILY    midodrine (PROAMITINE) tablet 10 mg  10 mg Oral TID WITH MEALS     ______________________________________________________________________  EXPECTED LENGTH OF STAY: - - -  ACTUAL LENGTH OF STAY:          0                 Veronica Coleman NP

## 2017-08-31 NOTE — PROGRESS NOTES
Problem: Falls - Risk of  Goal: *Absence of Falls  Document Basil Fall Risk and appropriate interventions in the flowsheet.    Outcome: Progressing Towards Goal  Fall Risk Interventions:  Mobility Interventions: Patient to call before getting OOB                       History of Falls Interventions: Door open when patient unattended, Room close to nurse's station
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
## 2017-08-31 NOTE — PROGRESS NOTES
TRANSFER - OUT REPORT:    Verbal report given to Claudine Dhillon RN (name) on Aide Rush  being transferred to [facility] (unit) for routine progression of care       Report consisted of patients Situation, Background, Assessment and   Recommendations(SBAR). Information from the following report(s) SBAR, Kardex, [facility] and Recent Results was reviewed with the receiving nurse. Lines:   Peripheral IV 08/30/17 Right Wrist (Active)   Site Assessment Clean, dry, & intact 8/31/2017  5:11 PM   Phlebitis Assessment 0 8/31/2017  5:11 PM   Infiltration Assessment 0 8/31/2017  5:11 PM   Dressing Status Clean, dry, & intact 8/31/2017  5:11 PM   Dressing Type Transparent 8/31/2017  5:11 PM   Hub Color/Line Status Blue 8/31/2017  5:11 PM       Peripheral IV 08/30/17 Left Hand (Active)   Site Assessment Clean, dry, & intact 8/31/2017  5:11 PM   Phlebitis Assessment 0 8/31/2017  5:11 PM   Infiltration Assessment 0 8/31/2017  5:11 PM   Dressing Status Clean, dry, & intact 8/31/2017  5:11 PM   Dressing Type Transparent 8/31/2017  5:11 PM   Hub Color/Line Status Blue 8/31/2017  5:11 PM   Action Taken Blood drawn 8/30/2017  8:31 PM        Opportunity for questions and clarification was provided.       Patient transported with:   Registered Nurse

## 2017-08-31 NOTE — PROGRESS NOTES
Per NP got thigh high killian hose for pt. Package says Latex free. Pt states that he has tried them before and that even though they say Latex free he itches with them. I encouraged the pt to try the teds and pt states they are not latex free and he cannot wear them. Pt will not put them on at this time.

## 2017-08-31 NOTE — ED NOTES
TRANSFER - OUT REPORT:    Verbal report given to [address], RN on Felicita Medrano  being transferred to [place] [address] (unit) for routine progression of care       Report consisted of patients Situation, Background, Assessment and   Recommendations(SBAR). Information from the following report(s) ED Summary was reviewed with the receiving nurse. Lines:   Peripheral IV 08/30/17 Right Wrist (Active)       Peripheral IV 08/30/17 Left Hand (Active)   Site Assessment Clean, dry, & intact 8/30/2017  8:31 PM   Phlebitis Assessment 0 8/30/2017  8:31 PM   Infiltration Assessment 0 8/30/2017  8:31 PM   Dressing Status Clean, dry, & intact 8/30/2017  8:31 PM   Dressing Type Transparent 8/30/2017  8:31 PM   Hub Color/Line Status Blue;Flushed;Patent 8/30/2017  8:31 PM   Action Taken Blood drawn 8/30/2017  8:31 PM        Opportunity for questions and clarification was provided.       Patient transported with:  Transportation

## 2017-08-31 NOTE — CONSULTS
NEUROLOGY CONSULT NOTE    Name Feliciano Mancera Age 67 y.o. MRN [id number]  1945     Consulting Physician: Tarun Vail MD      Chief Complaint:  Dizziness, pre-syncope     Assessment:     Principal Problem:    Orthostatic hypotension (2017)    Active Problems:    Acute hyperkalemia (2017)      Pulmonary edema (2017)      Gait instability (2017)      67year old [facility] h/o CAD/MI, Afib, s/p PPM/AICD, Hodgkin lymphoma, CHF, remote stroke, [place] presenting from his [address] [place] [address] office with severe hypotension. Evidence of orthostatic intolerance this admission. Examination with rather severe essential tremor, no Parkinsonian features, unstable gait. Will complete laboratory investigations for further w/u of potential dysautonomia. Recommendations:   B12, TSH, copper, RJ, Ganglionic AchR ab, paraneoplastic panel  D/C Flomax  Increase Florinef 0.3mg/d- monitor for excessive supine hypertension  Conservative management for [place] discussed including slow postural changes, increased hydration, compression stockings, HOB elevated 30 degrees  PT/OT    Thank you very much for this referral. I appreciate the opportunity to participate in this patient's care. History of Present Illness: This is a 67 y.o.  left handed  male, we were asked to see for dizziness, pre-syncope. PMH notable for CAD/MI, Afib, s/p PPM/AICD, Hodgkin lymphoma, CHF, remote stroke, orthostatic hypotension. He presents from his outpatient provider's office with significant hypotension, gait instability and episodes of pre-syncope with increasing frequency over the past 3-4 months. He has suffered from orthostasis x 4 years, on Florinef and midodrine. Recent he has been experiencing episodes of dizziness/lightheadedness whenever standing with associated vertigo, nausea, focal weakness, numbness. He does report SOB and tunneling of his vision along with a sensation that he may pass out.   He denies ever losing consciousness fully with these events. No associated confusion or convulsions to suggest seizure activtiy. Orthostatic vitals are positive. He also has a h/o tremor x 6 years involving the b/l UE primarily occurring with action treated with primidone. Other ROS includes gait instability x 3-4 months. He ambulates with the assistance of a walking stick at home. Allergies   Allergen Reactions    Latex Rash    Dilantin [Phenytoin Sodium Extended] Other (comments)     \"makes me violent\"    Iodinated Contrast- Oral And Iv Dye Rash    Morphine Other (comments)     \"makes me violent\"    Pcn [Penicillins] Rash    Sulfa (Sulfonamide Antibiotics) Rash        Prior to Admission medications    Medication Sig Start Date End Date Taking? Authorizing Provider   amiodarone (CORDARONE) 200 mg tablet Take 200 mg by mouth daily. Historical Provider   aspirin delayed-release 81 mg tablet Take 81 mg by mouth daily. Historical Provider   fludrocortisone (FLORINEF) 0.1 mg tablet Take 0.2 mg by mouth daily. Historical Provider   levothyroxine (SYNTHROID) 150 mcg tablet Take 150 mcg by mouth Daily (before breakfast). Historical Provider   midodrine (PROAMITINE) 10 mg tablet Take 10 mg by mouth three (3) times daily. Historical Provider   nitroglycerin (NITROSTAT) 0.4 mg SL tablet 0.4 mg by SubLINGual route every five (5) minutes as needed for Chest Pain. Historical Provider   primidone (MYSOLINE) 250 mg tablet Take 250 mg by mouth two (2) times a day. Historical Provider   tamsulosin (FLOMAX) 0.4 mg capsule Take 0.4 mg by mouth nightly. Historical Provider   cyanocobalamin 1,000 mcg tablet Take 2,000 mcg by mouth daily.     Historical Provider       Past Medical History:   Diagnosis Date    [facility]     Atrial fibrillation ([address]     Colonic polyp     Diverticulosis     Heart failure ([facility]     Heart murmur     Hemorrhoid     Hodgkin lymphoma ([facility] [facility]     Orthostatic hypotension     Rectal bleeding     Stroke (HCC)     Tremor     UTI (urinary tract infection)         Past Surgical History:   Procedure Laterality Date    HX IMPLANTABLE CARDIOVERTER DEFIBRILLATOR      HX PACEMAKER          Social History   Substance Use Topics    Smoking status: Former Smoker    Smokeless tobacco: Not on file    Alcohol use No        History reviewed. No pertinent family history. Review of Systems:   Comprehensive review of systems performed and negative except for as listed above. Exam:     Visit Vitals    /66 (BP 1 Location: Left arm, BP Patient Position: Sitting)    Pulse 98    Temp 97.8 °F (36.6 °C)    Resp 18    Ht 6' 2\" (1.88 m)    Wt 70.3 kg (155 lb)    SpO2 99%    BMI 19.9 kg/m2        General: Cachectic appearing male, NAP   Head: Normocephalic, atraumatic, anicteric sclera   Lungs:  Clear to auscultation bilaterally, No wheezes or rubs   Cardiac: Irregular rate and rhythm with 2+AG   Abd: Bowel sounds were audible. No tenderness on palpation   Ext: No pedal edema   Skin: No overt signs of rash     Neurological Exam:  Mental Status: Alert and oriented to person place and time   Speech: Fluent no aphasia or dysarthria. Cranial Nerves:   Intact visual fields. Facial sensation is normal. Facial movement is symmetric. Palate is midline. Normal sternocleidomastoid strength. Tongue is midline. Hearing is intact bilaterally. Eyes: PERRL, EOM's full, no nystagmus, no ptosis. Motor:  Full and symmetric strength of upper and lower proximal and distal muscles. Normal bulk and tone. Reflexes:   Deep tendon reflexes 2+/4 and symmetrical.  Plantar response is downgoing b/l. Sensory:   Symmetrically intact  Except for decreased vibration b/l toes to ankles. Gait:  Gait is unsteady with hunched posture, not shuffling    Tremor:   +Action/postural moderate to severe tremor, not present at rest.  No accompanying bradykinesia, rigidity. Cerebellar:  Finger to nose and heel over shin to knee was demonstrated competently. Neurovascular: No carotid bruits. No JVD       Imaging  CT Results (maximum last 3): No results found for this or any previous visit. MRI Results (maximum last 3): No results found for this or any previous visit. Lab Review  Lab Results   Component Value Date/Time    WBC 6.1 08/30/2017 07:48 PM    HCT 39.2 08/30/2017 07:48 PM    HGB 12.9 08/30/2017 07:48 PM    PLATELET 196 39/88/1041 07:48 PM     Lab Results   Component Value Date/Time    Sodium 146 08/31/2017 05:13 AM    Potassium 3.5 08/31/2017 05:13 AM    Chloride 112 08/31/2017 05:13 AM    CO2 27 08/31/2017 05:13 AM    Glucose 96 08/31/2017 05:13 AM    BUN 12 08/31/2017 05:13 AM    Creatinine 1.01 08/31/2017 05:13 AM    Calcium 7.5 08/31/2017 05:13 AM     No components found for: TROPQUANT  No results found for: POLLO    Signed:  Yasmine Roberts DO  8/31/2017  1:31 PM

## 2017-08-31 NOTE — ED PROVIDER NOTES
HPI Comments: 67 y.o. male with past medical history significant for acute MI, a-fib, heart failure, stroke, UTI, orthostatic hypotension, hodgkin lymphoma, and diverticulitis who presents via EMS from Dr Ashutosh Perez office accompanied by his wife with chief complaint of low blood pressure. Pt states he was seeing his PCP, Dr. Alicia Asher, in the office today for f/u regarding recent hospital admission when his blood pressure was noted to be 90/30. Pt received an infusion in the office and his blood pressure increased to 75/40. Pt was the referred to the ED - preferably [facility] where his PCP has privileges - but he was diverted to [facility]. Pt reports difficulty walking, falling and hypotension since being diagnosed with a UTI in the hospital. Pt endorses a history of orthostatic hypotension. Pt still complains of dysuria and suspects infection has not improved. Pt also reports ongoing chest pain that is \"worse since they put the pacemaker in\". Pt regularly takes fludrocortisone, amiodarone, synthroid, midodrine, primidone, and Flomax. Pt is a poor historian. There are no other acute medical concerns at this time. Social hx: former tobacco smoker; denies EtOH use  PCP: Daniela Ding MD    Note written by Paul Nickerson, as dictated by Tiara Ortiz MD 8:09 PM         The history is provided by the patient and the spouse. No  was used. Past Medical History:   Diagnosis Date    [facility]     Atrial fibrillation (HCC)     Colonic polyp     Diverticulosis     Heart failure (HCC)     Heart murmur     Hemorrhoid     Hodgkin lymphoma (HCC)     Orthostatic hypotension     Rectal bleeding     Stroke (HCC)     Tremor     UTI (urinary tract infection)        Past Surgical History:   Procedure Laterality Date    HX IMPLANTABLE CARDIOVERTER DEFIBRILLATOR      HX PACEMAKER           History reviewed. No pertinent family history.     Social History     Social History    Marital status:      Spouse name: N/A    Number of children: N/A    Years of education: N/A     Occupational History    Not on file. Social History Main Topics    Smoking status: Former Smoker    Smokeless tobacco: Not on file    Alcohol use No    Drug use: Not on file    Sexual activity: Not on file     Other Topics Concern    Not on file     Social History Narrative         ALLERGIES: Latex; Dilantin [phenytoin sodium extended]; Iodinated contrast- oral and iv dye; Morphine; Pcn [penicillins]; and Sulfa (sulfonamide antibiotics)    Review of Systems   Cardiovascular: Positive for chest pain. Gastrointestinal: Negative for abdominal pain, diarrhea, nausea and vomiting. Genitourinary: Positive for dysuria. All other systems reviewed and are negative. Vitals:    08/30/17 1928 08/30/17 1939 08/30/17 1940   BP: 111/72 128/70    Pulse:  92    Resp:  18    Temp:  97.7 °F (36.5 °C)    SpO2: 99%     Weight:   70.3 kg (155 lb)   Height:   6' 2\" (1.88 m)            Physical Exam   Constitutional: He is oriented to person, place, and time. He appears well-developed. He appears ill. No distress. Thin, chronically ill-appearing   HENT:   Head: Normocephalic and atraumatic. Eyes: Conjunctivae and EOM are normal.   Neck: Normal range of motion. Neck supple. Cardiovascular: Normal rate, regular rhythm and normal heart sounds. No murmur heard. Pulmonary/Chest: Effort normal. No respiratory distress. He has wheezes (faint ) in the left upper field. Equal breath sounds bilaterally. Abdominal: Soft. Bowel sounds are normal. He exhibits no distension. There is no tenderness. There is no rebound. Musculoskeletal: Normal range of motion. He exhibits no edema or tenderness. Neurological: He is alert and oriented to person, place, and time. No cranial nerve deficit. He exhibits normal muscle tone. Coordination normal.   Skin: Skin is warm and dry.    Nursing note and vitals reviewed. Note written by Paul Bower, as dictated by Charlie Holbrook MD 8:09 PM     [facility]  ED Course       Procedures    ED EKG interpretation:  Rhythm: sinus rhythm with frequent ventricular-paced complexes and LBBB. Rate (approx.): 88; Axis: normal; ST/T wave: normal. No STEMI. Note written by Paul Bower, as dictated by Charlie Holbrook MD 7:44 PM    CONSULT NOTE:  11:33 PM Charlie Holbrook MD spoke with Dr. Magnus Grider, Consult for Hospitalist.  Discussed available diagnostic tests and clinical findings. He is in agreement with care plans as outlined. Dr. Magnus Grider will evaluate and admit the patient.

## 2017-08-31 NOTE — PROGRESS NOTES
Problem: Self Care Deficits Care Plan (Adult)  Goal: *Acute Goals and Plan of Care (Insert Text)  Occupational Therapy Goals  Initiated 8/31/2017    1. Patient will perform bathing seated EOB unsupported for 10 minutes with supervision/set-up within 7 days. 2. Patient will perform bathroom mobility with overall supervision/set-up (seated PRN d/t +OH) within 7 days. 3. Patient will utilize compensatory techniques during ADLs (seated tasks s/t OH) with no cues within 7 days. 4. Patient will perform LB ADLs with supervision/set-up within 7 days. 5. Patient will participate in UE therapeutic exercise/activities with Angelique for 10 minutes within 7 days. OCCUPATIONAL THERAPY EVALUATION  Patient: Asuncion Dye (1 y.o. male)  Date: 8/31/2017  Primary Diagnosis: Acute hyperkalemia  Inability to walk  Hypotension  Pulmonary edema        Precautions:   Fall (h/o OH, pacemaker, tremor)      ASSESSMENT :  Based on the objective data described below, the patient presents with dizziness at rest and with positional changes, hand tremors at baseline, h/o +OH, impaired dynamic standing balance, generally decreased functional strength and activity tolerance, limiting his independence and safety with ADL routine. Pt alert and oriented x4. Pt is overall supervision/set-up to maxA for self-care tasks, greatly limited by dizziness with all tasks. Pt supervision bed mobility, CGA-Jamie for transfer and unable to tolerate standing >30 seconds d/t dizziness; unable to get BP in standing. Please see doc flow for BP details. Pt reports he is typically IND-Angelique with ADLs, lives with wife, however, does have his \"bad days of dizziness\". Education provided on impact of OH and daily ADLs, compensatory strategies and changing positions slowly. Pt may benefit from compression stockings/abdominal binder d/t h/o OH and dizziness. Anticipate once BP stable and symptoms resolve, return home with wife.      Patient will benefit from skilled intervention to address the above impairments. Patients rehabilitation potential is considered to be Good  Factors which may influence rehabilitation potential include:   [ ]             None noted  [ ]             Mental ability/status  [ ]             Medical condition  [ ]             Home/family situation and support systems  [ ]             Safety awareness  [ ]             Pain tolerance/management  [X]             Other: +OH and dizziness       PLAN :  Recommendations and Planned Interventions:  [X]               Self Care Training                  [X]        Therapeutic Activities  [X]               Functional Mobility Training    [ ]        Cognitive Retraining  [X]               Therapeutic Exercises           [X]        Endurance Activities  [X]               Balance Training                   [ ]        Neuromuscular Re-Education  [X]               Visual/Perceptual Training     [X]   Home Safety Training  [X]               Patient Education                 [X]        Family Training/Education  [ ]               Other (comment):     Frequency/Duration: Patient will be followed by occupational therapy 5 times a week to address goals. Discharge Recommendations: To Be Determined  Further Equipment Recommendations for Discharge: tub bench d/t OH       SUBJECTIVE:   Patient stated When I stood up, I knew I was looking at you but you were just a shadow.       OBJECTIVE DATA SUMMARY:   HISTORY:   Past Medical History:   Diagnosis Date    [facility]      Atrial fibrillation [facility]      Colonic polyp      Diverticulosis      Heart failure (HCC)      Heart murmur      Hemorrhoid      Hodgkin lymphoma (HCC)      Orthostatic hypotension      Rectal bleeding      Stroke (HCC)      Tremor      UTI (urinary tract infection)       Past Surgical History:   Procedure Laterality Date    HX IMPLANTABLE CARDIOVERTER DEFIBRILLATOR        HX PACEMAKER            Prior Level of Function/Home Situation: Pt lives with wife, pt reports he is overall IND-Angelique with ADLs. Does not drive. Wife completes IADLs. Occasional falls. Uses walking stick PRN. Expanded or extensive additional review of patient history: hand tremors, +OH, pacemaker, MI     Home Situation  Home Environment: Private residence  # Steps to Enter: 5  One/Two Story Residence: One story  Living Alone: No  Support Systems: Spouse/Significant Other/Partner  Patient Expects to be Discharged to[de-identified] Private residence  Current DME Used/Available at Home: Grab bars  Tub or Shower Type: Tub  [ ]  Right hand dominant   [X]  Left hand dominant     EXAMINATION OF PERFORMANCE DEFICITS:  Cognitive/Behavioral Status:  Neurologic State: Alert; Appropriate for age  Orientation Level: Oriented X4  Cognition: Follows commands; Appropriate safety awareness; Appropriate for age attention/concentration; Appropriate decision making  Perception: Appears intact  Perseveration: No perseveration noted  Safety/Judgement: Awareness of environment; Fall prevention;Good awareness of safety precautions; Home safety; Insight into deficits     Skin: appears intact     Edema: none noted in BUEs     Hearing: Auditory  Auditory Impairment: None     Vision/Perceptual:    Tracking: Able to track stimulus in all quadrants w/o difficulty                 Diplopia: No    Acuity: Able to read clock/calendar on wall without difficulty; Able to read employee name badge without difficulty          Range of Motion:     AROM: Generally decreased, functional  PROM: Within functional limits                       Strength:     Strength: Generally decreased, functional                 Coordination:  Coordination: Generally decreased, functional  Fine Motor Skills-Upper: Right Impaired;Left Impaired (resting tremors)    Gross Motor Skills-Upper: Left Intact; Right Intact     Tone & Sensation:     Tone: Normal  Sensation: Intact                       Balance:  Sitting: Intact  Standing: Impaired  Standing - Static: Fair  Standing - Dynamic : Poor (d/t dizziness)     Functional Mobility and Transfers for ADLs:  Bed Mobility:  Supine to Sit: Supervision; Additional time  Scooting: Contact guard assistance     Transfers:  Sit to Stand: Contact guard assistance; Additional time;Assist x1  Stand to Sit: Minimum assistance;Assist x1  Toilet Transfer : Moderate assistance (inferred d/t OH and impaired dynamic balance)     ADL Assessment:  Feeding: Supervision;Setup; Additional time     Oral Facial Hygiene/Grooming: Supervision;Setup; Additional time     Bathing: Minimum assistance; Adaptive equipment; Additional time;Assist x1 (inferred if seated)     Upper Body Dressing: Supervision;Setup; Additional time (assist for FM buttons)     Lower Body Dressing: Moderate assistance; Adaptive equipment;Assist x1;Additional time (inferred for standing d/t OH and balance deficits)     Toileting: Minimum assistance; Adaptive equipment; Additional time;Assist x1                 ADL Intervention and task modifications:     Pt unable to tolerate >30 seconds standing to participate in standing ADLs d/t dizziness. Cognitive Retraining  Safety/Judgement: Awareness of environment; Fall prevention;Good awareness of safety precautions; Home safety; Insight into deficits     Functional Measure:  Barthel Index:      Bathin  Bladder: 10  Bowels: 10  Groomin  Dressin  Feeding: 10  Mobility: 0  Stairs: 0  Toilet Use: 5  Transfer (Bed to Chair and Back): 10  Total: 55         Barthel and G-code impairment scale:  Percentage of impairment CH  0% CI  1-19% CJ  20-39% CK  40-59% CL  60-79% CM  80-99% CN  100%   Barthel Score 0-100 100 99-80 79-60 59-40 20-39 1-19    0   Barthel Score 0-20 20 17-19 13-16 9-12 5-8 1-4 0      The Barthel ADL Index: Guidelines  1. The index should be used as a record of what a patient does, not as a record of what a patient could do.   2. The main aim is to establish degree of independence from any help, physical or verbal, however minor and for whatever reason. 3. The need for supervision renders the patient not independent. 4. A patient's performance should be established using the best available evidence. Asking the patient, friends/relatives and nurses are the usual sources, but direct observation and common sense are also important. However direct testing is not needed. 5. Usually the patient's performance over the preceding 24-48 hours is important, but occasionally longer periods will be relevant. 6. Middle categories imply that the patient supplies over 50 per cent of the effort. 7. Use of aids to be independent is allowed. Bill Dai., Barthel, D.W. ([id number]). Functional evaluation: the Barthel Index. [facility] (14)2. Gaile Rubinstein marty JEANNE Baltazar, Roberto Maier., Chelsey Lo., Yaritza Benítez, [facility] (1999). Measuring the change indisability after inpatient rehabilitation; comparison of the responsiveness of the Barthel Index and Functional [place] Measure. Journal of Neurology, Neurosurgery, and Psychiatry, 66(4), 729-040. KADY Mcclain, EZEKIEL Roberts, & Jolly Crooks M.A. (2004.) Assessment of post-stroke quality of life in cost-effectiveness studies: The usefulness of the Barthel Index and the EuroQoL-5D. Quality of Life Research, 13, 279-60            G codes: In compliance with CMSs Claims Based Outcome Reporting, the following G-code set was chosen for this patient based on their primary functional limitation being treated: The outcome measure chosen to determine the severity of the functional limitation was the Barthel Index with a score of 55/100 which was correlated with the impairment scale.       · Self Care:               - CURRENT STATUS:    CK - 40%-59% impaired, limited or restricted               - GOAL STATUS:           CI - 1%-19% impaired, limited or restricted               - D/C STATUS:                       ---------------To be determined---------------      Occupational Therapy Evaluation Charge Determination   History Examination Decision-Making   MEDIUM Complexity : Expanded review of history including physical, cognitive and psychosocial  history  LOW Complexity : 1-3 performance deficits relating to physical, cognitive , or psychosocial skils that result in activity limitations and / or participation restrictions  LOW Complexity : No comorbidities that affect functional and no verbal or physical assistance needed to complete eval tasks       Based on the above components, the patient evaluation is determined to be of the following complexity level: LOW      Activity Tolerance:   Dizziness with positional changes. Patient Vitals for the past 4 hrs:    positon Pulse Resp BP SpO2   08/31/17 0913 Sitting post activity (!) 111 - 112/76 98 %   08/31/17 0909 Sitting pre activity  (!) 105 - 117/67 98 %   08/31/17 0906 sitting (!) 103 - 109/66 97 %   08/31/17 0905 sitting (!) 102 - 115/61 98 %   08/31/17 0900 supine 93 - 118/64 97 %          Please refer to the flowsheet for vital signs taken during this treatment. After treatment:   [ ] Patient left in no apparent distress sitting up in chair  [X] Patient left in no apparent distress in bed  [X] Call bell left within reach  [X] Nursing notified  [ ] Caregiver present  [ ] Bed alarm activated      COMMUNICATION/EDUCATION:   The patients plan of care was discussed with: Registered Nurse.  [X] Home safety education was provided and the patient/caregiver indicated understanding. [X] Patient/family have participated as able in goal setting and plan of care. [X] Patient/family agree to work toward stated goals and plan of care. [ ] Patient understands intent and goals of therapy, but is neutral about his/her participation. [ ] Patient is unable to participate in goal setting and plan of care. This patients plan of care is appropriate for delegation to [facility].      Thank you for this referral.  Socorro Barrera, OT  Time Calculation: 26 mins

## 2017-08-31 NOTE — ED NOTES
Upon standing to ambulate pt stated \"I have to sit down, I have to sit down! \" Pt immediately sat on bed and when asked pt complained of dizziness, feeling like being on a boat and \"everythign going black. Pt also complained of becoming short of breath. MD notified.

## 2017-09-01 PROCEDURE — 74011250637 HC RX REV CODE- 250/637: Performed by: NURSE PRACTITIONER

## 2017-09-01 PROCEDURE — 74011250637 HC RX REV CODE- 250/637: Performed by: FAMILY MEDICINE

## 2017-09-01 PROCEDURE — 74011250636 HC RX REV CODE- 250/636: Performed by: FAMILY MEDICINE

## 2017-09-01 PROCEDURE — 65270000032 HC RM SEMIPRIVATE

## 2017-09-01 RX ORDER — FLUDROCORTISONE ACETATE 0.1 MG/1
100 TABLET ORAL
Status: COMPLETED | OUTPATIENT
Start: 2017-09-01 | End: 2017-09-01

## 2017-09-01 RX ORDER — FLUDROCORTISONE ACETATE 0.1 MG/1
400 TABLET ORAL DAILY
Status: DISCONTINUED | OUTPATIENT
Start: 2017-09-02 | End: 2017-09-06 | Stop reason: HOSPADM

## 2017-09-01 RX ORDER — ONDANSETRON 2 MG/ML
4 INJECTION INTRAMUSCULAR; INTRAVENOUS
Status: DISCONTINUED | OUTPATIENT
Start: 2017-09-01 | End: 2017-09-06 | Stop reason: HOSPADM

## 2017-09-01 RX ORDER — FLUDROCORTISONE ACETATE 0.1 MG/1
400 TABLET ORAL DAILY
Status: DISCONTINUED | OUTPATIENT
Start: 2017-09-02 | End: 2017-09-01

## 2017-09-01 RX ORDER — AMIODARONE HYDROCHLORIDE 200 MG/1
100 TABLET ORAL DAILY
Status: DISCONTINUED | OUTPATIENT
Start: 2017-09-02 | End: 2017-09-05

## 2017-09-01 RX ADMIN — FLUDROCORTISONE ACETATE 300 MCG: 0.1 TABLET ORAL at 09:12

## 2017-09-01 RX ADMIN — ASPIRIN 81 MG: 81 TABLET, COATED ORAL at 09:12

## 2017-09-01 RX ADMIN — HYDROCORTISONE: 1 CREAM TOPICAL at 18:26

## 2017-09-01 RX ADMIN — FLUDROCORTISONE ACETATE 100 MCG: 0.1 TABLET ORAL at 17:29

## 2017-09-01 RX ADMIN — MIDODRINE HYDROCHLORIDE 10 MG: 5 TABLET ORAL at 12:35

## 2017-09-01 RX ADMIN — ONDANSETRON 4 MG: 2 INJECTION INTRAMUSCULAR; INTRAVENOUS at 17:29

## 2017-09-01 RX ADMIN — PRIMIDONE 250 MG: 250 TABLET ORAL at 09:26

## 2017-09-01 RX ADMIN — HYDROCORTISONE: 1 CREAM TOPICAL at 12:44

## 2017-09-01 RX ADMIN — PRIMIDONE 250 MG: 250 TABLET ORAL at 17:29

## 2017-09-01 RX ADMIN — Medication 10 ML: at 07:13

## 2017-09-01 RX ADMIN — MIDODRINE HYDROCHLORIDE 10 MG: 5 TABLET ORAL at 17:29

## 2017-09-01 RX ADMIN — MIDODRINE HYDROCHLORIDE 10 MG: 5 TABLET ORAL at 07:12

## 2017-09-01 RX ADMIN — Medication 10 ML: at 12:36

## 2017-09-01 RX ADMIN — Medication 2000 MCG: at 09:12

## 2017-09-01 RX ADMIN — Medication 10 ML: at 22:18

## 2017-09-01 RX ADMIN — LEVOTHYROXINE SODIUM 150 MCG: 150 TABLET ORAL at 07:12

## 2017-09-01 NOTE — PROGRESS NOTES
called , stated specific test no longer available at [address]). Dr Narendra Alonso called. Spoke with Milad Thomas who will relay message and told to have Dr. Sylvester Meraz call Jose Andrews in lab at [phone].

## 2017-09-01 NOTE — PROGRESS NOTES
Bedside shift change report given to Mariann (oncoming nurse) by Nieves De La Cruz (offgoing nurse). Report included the following information SBAR.

## 2017-09-01 NOTE — CDMP QUERY
Thank you for documenting the diagnosis of A-fib for this patient. Please clarify if this diagnosis could be further specified as:     =>Paroxysmal  =>Persistent  =>Permanent  =>Other explanations of clinical findings  =>Unable to Determine    Please document your response indicating your clinical opinion in your progress notes and discharge summary. -------------------------------------------------------------------------------------------------  Paroxysmal:  begins suddenly and stops on its own. Symptoms can be mild or severe. They stop within about a week, but usually in less than 24 hours. Persistent: continues for more than a week. It may stop on its own, or it can be stopped with treatment.     Permanent: cannot be restored with treatment    Thank you  Aure Subramanian  [facility]  [phone]

## 2017-09-01 NOTE — PROGRESS NOTES
Hospitalist Progress Note          Jose Carlos Clemens MD  Please call  and page for questions. Call physician on-call through the  7pm-7am    Daily Progress Note: 9/1/2017    Primary care Marine Garcia MD    Date of admission: 8/30/2017  7:25 PM    Admission summery and hospital course:  67 yom with pmh of CAD, MI, afib, s/p PPM, PTCA, stent,heart failure, stroke, UTI, orthostatic hypotension, Hodgkin lymphoma s/p chemo, diverticulitis, hemorrhoids, heart murmur, GI bleed, tremors who present from Dr Izabela Chery [facility] nephrologist) for hypotension. BP dropped to 75/40 in office so he was sent to ED for further eval. Patient has h/o orthostatic hypotension for >4 yrs and has had extensive w/u with cardiology, neurology, and nephrology at [facility].      Subjective:   Patient said he has not been out of the bed due to dizziness every time he tries to get up. Assessment/Plan:   Orthostatic Hypotension  -stopped Flomax  -continue home Midodrine, will increase Florinef to 0.4mg  -orthostatics +, s/p IVF-> stopped on 08/31 given pulmonary edema on CXR on admission  -thigh high RODRIGO hose, patient does not want to use it. Monitor orthostatic.   -echo ef 55-60% no wall abnormalities      Hypothyroidism   -tsh wnl   -continue home levothyroxine     Difficulty with ambulating   -Continue PT/OT as he tolerates.      Resting tremor  -resume home meds     Acute Hyperkalemia-resolved  -now wnl s/p Kayexalate     Persistent a-fib: Will continue amiodarone.      Code status: Full      Disposition: TBD.         Review of Systems:     Review of Systems:  Symptom  Y/N  Comments   Symptom  Y/N  Comments    Fenver/Chills   n   Chest Pain  n    Poor Appetite  n    Edema  n     Cough  n   Abdominal Pain  n     Sputum  n   Joint Pain  n    SOB/SHELL  n   Pruritis/Rash      Nausea/vomit  y Nausea  Tolerating PT/OT      Diarrhea     Tolerating Diet      Constipation     Other      Could not obtain due to:         Objective:   Physical Exam:     Visit Vitals    /66    Pulse 85    Temp 97.9 °F (36.6 °C)    Resp 16    Ht 6' 2\" (1.88 m)    Wt 70.3 kg (155 lb)    SpO2 100%    BMI 19.9 kg/m2      O2 Device: Room air    Temp (24hrs), Av.9 °F (36.6 °C), Min:97.4 °F (36.3 °C), Max:98.4 °F (36.9 °C)    701 - 1900  In: -   Out: 725 [Urine:725]   1901 - 700  In: -   Out: 200 [Urine:200]    Constitutional:  No acute distress, cooperative, pleasant    ENT:  Oral mucous moist, oropharynx benign. Neck supple,    Resp:  CTA bilaterally. No wheezing/rhonchi/rales. No accessory muscle use   CV:  Irregular rhythm, normal rate, no murmurs, gallops, rubs    GI:  Soft, non distended, non tender. normoactive bowel sounds, no hepatosplenomegaly     Musculoskeletal:  No edema, warm, 2+ pulses throughout    Neurologic:  Moves all extremities. AAOx3, CN II-XII reviewed                                             Psych:  Good insight, Not anxious nor agitated. Data Review:       Recent Days:  Recent Labs      17   WBC  6.1   HGB  12.9   HCT  39.2   PLT  181     Recent Labs      17   0513  17   NA  146*  138   K  3.5  5.2*   CL  112*  107   CO2  27  24   GLU  96  91   BUN  12  15   CREA  1.01  1.19   CA  7.5*  7.7*   ALB   --   3.3*   SGOT   --   34   ALT   --   17     No results for input(s): PH, PCO2, PO2, HCO3, FIO2 in the last 72 hours.     24 Hour Results:  Recent Results (from the past 24 hour(s))   VITAMIN B12    Collection Time: 17  5:40 PM   Result Value Ref Range    Vitamin B12 457 211 - 911 pg/mL       Problem List:  Problem List as of 2017  Date Reviewed: 2017          Codes Class Noted - Resolved    Acute hyperkalemia ICD-10-CM: E87.5  ICD-9-CM: 276.7  2017 - Present        Pulmonary edema ICD-10-CM: J81.1  ICD-9-CM: 495  2017 - Present        * (Principal)Orthostatic hypotension ICD-10-CM: I95.1  ICD-9-CM: 458.0  2017 - Present        Gait instability ICD-10-CM: R26.81  ICD-9-CM: 781.2  8/31/2017 - Present              Medications reviewed  Current Facility-Administered Medications   Medication Dose Route Frequency    sodium chloride (NS) flush 5-10 mL  5-10 mL IntraVENous Q8H    sodium chloride (NS) flush 5-10 mL  5-10 mL IntraVENous PRN    aspirin delayed-release tablet 81 mg  81 mg Oral DAILY    levothyroxine (SYNTHROID) tablet 150 mcg  150 mcg Oral ACB    cyanocobalamin (VITAMIN B12) tablet 2,000 mcg  2,000 mcg Oral DAILY    primidone (MYSOLINE) tablet 250 mg  250 mg Oral BID    hydrocortisone (CORTAID) 1 % cream   Topical TID PRN    fludrocortisone (FLORINEF) tablet 300 mcg  0.3 mg Oral DAILY    midodrine (PROAMITINE) tablet 10 mg  10 mg Oral TID WITH MEALS       Care Plan discussed with: Patient/Family, Nurse and     Total time spent with patient: 30 minutes.     Elisabeth Orr MD

## 2017-09-01 NOTE — CDMP QUERY
Based on the need for increased specificity in documentation for the new ICD 10 coding system, please include the following components in your documentation regarding:  CHF     Documentation for heart failure must:   Specify Acuity :   Acute, Chronic, acute on chronic  Identify Type:    Systolic, Diastolic, Combined systolic and diastolic failure   List the relationship of HTN to Heart Failure  Identify the underlying cause     Please clarify and document your clinical opinion in the progress notes and discharge summary including the definitive and/or presumptive diagnosis, (suspected or probable), related to the above clinical findings. Please include clinical findings supporting your diagnosis.     Thank you  Cleven Numbers  MP  [phone]

## 2017-09-01 NOTE — PROGRESS NOTES
Spiritual Care Partner Volunteer visited patient in Surgical unit on 9/1/17.   Documented by:  Chula Isidro [address] ([id number])

## 2017-09-01 NOTE — PROGRESS NOTES
Physical Therapy  9/1/2017    Spoke with RN for mobility clearance this afternoon. RN reporting pt remains with significant postural hypotension today. Dropping from 126/78 to 79/64 when moving from supine to sitting with subsequent dizziness. Will defer PT treatment for now until BP stabilizes. Of note, pt ambulated yesterday after taking orthostatic vitals. Pt unsteady on feet with LOB and continued drop in BP with activity.      Thank Malu Mercado, PT, DPT
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
## 2017-09-01 NOTE — PROGRESS NOTES
Neurology Progress Note    Patient ID:  Bijal Harvey  [id number]  71 y.o.  1945    Subjective:   Still with significant postural hypotension today. No new complaints. No excessive hypertension with increase in florinef    Current Facility-Administered Medications   Medication Dose Route Frequency    sodium chloride (NS) flush 5-10 mL  5-10 mL IntraVENous Q8H    sodium chloride (NS) flush 5-10 mL  5-10 mL IntraVENous PRN    aspirin delayed-release tablet 81 mg  81 mg Oral DAILY    levothyroxine (SYNTHROID) tablet 150 mcg  150 mcg Oral ACB    cyanocobalamin (VITAMIN B12) tablet 2,000 mcg  2,000 mcg Oral DAILY    primidone (MYSOLINE) tablet 250 mg  250 mg Oral BID    hydrocortisone (CORTAID) 1 % cream   Topical TID PRN    fludrocortisone (FLORINEF) tablet 300 mcg  0.3 mg Oral DAILY    midodrine (PROAMITINE) tablet 10 mg  10 mg Oral TID WITH MEALS          Objective:     Patient Vitals for the past 8 hrs:   BP Temp Pulse Resp SpO2   09/01/17 0857 126/78 - 100 - -   09/01/17 0854 (!) 79/64 - (!) 115 - -   09/01/17 0849 92/71 - 96 - -   09/01/17 0845 138/78 97.4 °F (36.3 °C) 97 20 98 %   09/01/17 0712 107/67 - 93 - -          08/30 1901 - 09/01 0700  In: -   Out: 200 [Urine:200]    Lab Review   Recent Results (from the past 24 hour(s))   VITAMIN B12    Collection Time: 08/31/17  5:40 PM   Result Value Ref Range    Vitamin B12 457 211 - 911 pg/mL     Neurological Exam:  Mental Status: Alert and oriented to person place and time   Speech: Fluent no aphasia or dysarthria. Cranial Nerves:   Intact visual fields. Facial sensation is normal. Facial movement is symmetric. Palate is midline. Normal sternocleidomastoid strength. Tongue is midline. Hearing is intact bilaterally. Eyes: PERRL, EOM's full, no nystagmus, no ptosis. Motor:  Full and symmetric strength of upper and lower proximal and distal muscles. Normal bulk and tone.     Reflexes:   Deep tendon reflexes 2+/4 and symmetrical.  Plantar response is downgoing b/l. Sensory:   Symmetrically intact  Except for decreased vibration b/l toes to ankles. Gait:  Gait is unsteady with hunched posture, not shuffling    Tremor:   +Action/postural moderate to severe tremor, not present at rest.  No accompanying bradykinesia, rigidity. Cerebellar:  Finger to nose and heel over shin to knee was demonstrated competently. Neurovascular: No carotid bruits. No JVD          Assessment:     Principal Problem:    Orthostatic hypotension (8/31/2017)    Active Problems:    Acute hyperkalemia (8/31/2017)      Pulmonary edema (8/31/2017)      Gait instability (8/31/2017)    67year old M h/o CAD/MI, Afib, s/p PPM/AICD, Hodgkin lymphoma, CHF, remote stroke, OH presenting from his  [id number] office with severe hypotension. Evidence of orthostatic intolerance this admission. Examination with rather severe essential tremor, no Parkinsonian features, unstable gait. Will complete laboratory investigations for further w/u of potential dysautonomia.     Recommendations:   F/U dysautonomia labs  Increase Florinef 0.4mg/d- monitor for excessive supine hypertension  Conservative management for OH discussed   HOB elevated 30 degrees  PT/OT  Please call if further questions over the weekend- Dr Nubia Collado assuming service tonight    Signed:  Zachary Swanson DO  9/1/2017

## 2017-09-01 NOTE — PROGRESS NOTES
Bedside shift change report given to [address] (oncoming nurse) by Letty Perez (offgoing nurse). Report included the following information SBAR, Kardex, Intake/Output and MAR.

## 2017-09-02 LAB — COPPER SERPL-MCNC: 116 UG/DL (ref 72–166)

## 2017-09-02 PROCEDURE — 74011250637 HC RX REV CODE- 250/637: Performed by: FAMILY MEDICINE

## 2017-09-02 PROCEDURE — 74011250636 HC RX REV CODE- 250/636: Performed by: FAMILY MEDICINE

## 2017-09-02 PROCEDURE — 86256 FLUORESCENT ANTIBODY TITER: CPT | Performed by: PSYCHIATRY & NEUROLOGY

## 2017-09-02 PROCEDURE — 83516 IMMUNOASSAY NONANTIBODY: CPT | Performed by: PSYCHIATRY & NEUROLOGY

## 2017-09-02 PROCEDURE — 65270000032 HC RM SEMIPRIVATE

## 2017-09-02 PROCEDURE — 36415 COLL VENOUS BLD VENIPUNCTURE: CPT | Performed by: PSYCHIATRY & NEUROLOGY

## 2017-09-02 RX ORDER — TAMSULOSIN HYDROCHLORIDE 0.4 MG/1
0.4 CAPSULE ORAL ONCE
Status: COMPLETED | OUTPATIENT
Start: 2017-09-02 | End: 2017-09-02

## 2017-09-02 RX ORDER — SODIUM CHLORIDE 9 MG/ML
100 INJECTION, SOLUTION INTRAVENOUS CONTINUOUS
Status: DISPENSED | OUTPATIENT
Start: 2017-09-02 | End: 2017-09-03

## 2017-09-02 RX ADMIN — SODIUM CHLORIDE 100 ML/HR: 900 INJECTION, SOLUTION INTRAVENOUS at 15:05

## 2017-09-02 RX ADMIN — PRIMIDONE 250 MG: 250 TABLET ORAL at 08:46

## 2017-09-02 RX ADMIN — MIDODRINE HYDROCHLORIDE 10 MG: 5 TABLET ORAL at 16:52

## 2017-09-02 RX ADMIN — LEVOTHYROXINE SODIUM 150 MCG: 150 TABLET ORAL at 08:41

## 2017-09-02 RX ADMIN — MIDODRINE HYDROCHLORIDE 10 MG: 5 TABLET ORAL at 08:41

## 2017-09-02 RX ADMIN — Medication 2000 MCG: at 08:41

## 2017-09-02 RX ADMIN — TAMSULOSIN HYDROCHLORIDE 0.4 MG: 0.4 CAPSULE ORAL at 15:04

## 2017-09-02 RX ADMIN — Medication 10 ML: at 08:48

## 2017-09-02 RX ADMIN — Medication 10 ML: at 15:05

## 2017-09-02 RX ADMIN — FLUDROCORTISONE ACETATE 400 MCG: 0.1 TABLET ORAL at 08:40

## 2017-09-02 RX ADMIN — ASPIRIN 81 MG: 81 TABLET, COATED ORAL at 08:41

## 2017-09-02 RX ADMIN — AMIODARONE HYDROCHLORIDE 100 MG: 200 TABLET ORAL at 08:41

## 2017-09-02 RX ADMIN — PRIMIDONE 250 MG: 250 TABLET ORAL at 17:01

## 2017-09-02 RX ADMIN — Medication 10 ML: at 21:53

## 2017-09-02 RX ADMIN — MIDODRINE HYDROCHLORIDE 10 MG: 5 TABLET ORAL at 15:04

## 2017-09-02 NOTE — PROGRESS NOTES
Problem: Falls - Risk of  Goal: *Absence of Falls  Document Basil Fall Risk and appropriate interventions in the flowsheet.    Outcome: Progressing Towards Goal  Fall Risk Interventions:  Mobility Interventions: Patient to call before getting OOB           Medication Interventions: Patient to call before getting OOB           History of Falls Interventions: Door open when patient unattended, Evaluate medications/consider consulting pharmacy

## 2017-09-02 NOTE — PROGRESS NOTES
Hospitalist Progress Note          Carmelita De La Rosa MD  Please call  and page for questions. Call physician on-call through the  7pm-7am    Daily Progress Note: 9/2/2017    Primary care Stanford Elizondo MD    Date of admission: 8/30/2017  7:25 PM    Admission summery and hospital course:  67 yom with pmh of CAD, MI, afib, s/p PPM, PTCA, stent,heart failure, stroke, UTI, orthostatic hypotension, Hodgkin lymphoma s/p chemo, diverticulitis, hemorrhoids, heart murmur, GI bleed, tremors who present from Dr Jj Figueroa [facility]rolog[facility]) for hypotension. BP dropped to 75/40 in office so he was sent to ED for further eval. Patient has h/o orthostatic hypotension for >4 yrs and has had extensive w/u with cardiology, neurology, and nephrology at [facility].      Subjective:   Patient said he is having trouble urinating now. Patient said he would like to have his flomax. Assessment/Plan:    Orthostatic Hypotension  -continue home Midodrine and now on Florinef to 0.4mg  -still has significant orthostatic hypotension. Patient said he used to get 2 liters of IV NS every other day at his PCP. We will do NS at the rate of 100/hour monitor closely. Pulmonary edema on CXR on admission.   -thigh high RODRIGO hose, patient does not want to use it. Patient said he does not tolerate sodium tab.    -echo ef 55-60% no wall abnormalities       Hypothyroidism   -tsh wnl   -continue home levothyroxine      Difficulty with ambulating   -Continue PT/OT as he tolerates.       Resting tremor  -resume home meds     Acute Hyperkalemia-resolved  -now wnl s/p Kayexalate      Persistent a-fib:  Will continue amiodarone.       Code status: Full       Disposition: TBD       Review of Systems:     Review of Systems:  Symptom  Y/N  Comments   Symptom  Y/N  Comments    Fever/Chills  n    Chest Pain  n    Poor Appetite  n    Edema   n    Cough  n   Abdominal Pain  n     Sputum  n   Joint Pain      SOB/SHELL  n Pruritis/Rash      Nausea/vomit  n   Tolerating PT/OT      Diarrhea  n   Tolerating Diet      Constipation     Other      Could not obtain due to:         Objective:   Physical Exam:     Visit Vitals    /82 (BP 1 Location: Right arm, BP Patient Position: At rest)    Pulse 80    Temp 98.8 °F (37.1 °C)    Resp 18    Ht 6' 2\" (1.88 m)    Wt 67.5 kg (148 lb 12.8 oz)    SpO2 100%    BMI 19.1 kg/m2      O2 Device: Room air    Temp (24hrs), Av.1 °F (36.7 °C), Min:97.7 °F (36.5 °C), Max:98.8 °F (37.1 °C)    701 - 1900  In: 300 [P.O.:300]  Out: -    1901 - 700  In: 650 [P.O.:650]  Out:  [Urine:1925]    Constitutional:  No acute distress, cooperative, pleasant    ENT:  Oral mucous moist, oropharynx benign. Neck supple,    Resp:  CTA bilaterally. No wheezing/rhonchi/rales. No accessory muscle use   CV:  Irregular rhythm, normal rate, no murmurs, gallops, rubs    GI:  Soft, non distended, non tender. normoactive bowel sounds, no hepatosplenomegaly     Musculoskeletal:  No edema, warm, 2+ pulses throughout    Neurologic:  Moves all extremities.  AAOx3, CN II-XII reviewed                                             Psych:  Good insight, Not anxious nor agitated. Data Review:       Recent Days:  Recent Labs      17   WBC  6.1   HGB  12.9   HCT  39.2   PLT  181     Recent Labs      17   0513  17   NA  146*  138   K  3.5  5.2*   CL  112*  107   CO2  27  24   GLU  96  91   BUN  12  15   CREA  1.01  1.19   CA  7.5*  7.7*   ALB   --   3.3*   SGOT   --   34   ALT   --   17     No results for input(s): PH, PCO2, PO2, HCO3, FIO2 in the last 72 hours.     24 Hour Results:  Recent Results (from the past 24 hour(s))   GM1 ANTIBODY IGG, IGM    Collection Time: 17  3:46 AM   Result Value Ref Range    GM1 Ab, IgM PENDING titer    GM1 IgG PENDING titer   PARANEOPLASTIC ABS W/REFLEX    Collection Time: 17  3:46 AM   Result Value Ref Range    Anti-Hu Ab PENDING Titer    Anti-Ri Ab PENDING Titer       Problem List:  Problem List as of 9/2/2017  Date Reviewed: 8/31/2017          Codes Class Noted - Resolved    Acute hyperkalemia ICD-10-CM: E87.5  ICD-9-CM: 276.7  8/31/2017 - Present        Pulmonary edema ICD-10-CM: J81.1  ICD-9-CM: 497  8/31/2017 - Present        * (Principal)Orthostatic hypotension ICD-10-CM: I95.1  ICD-9-CM: 458.0  8/31/2017 - Present        Gait instability ICD-10-CM: R26.81  ICD-9-CM: 781.2  8/31/2017 - Present              Medications reviewed  Current Facility-Administered Medications   Medication Dose Route Frequency    ondansetron (ZOFRAN) injection 4 mg  4 mg IntraVENous Q6H PRN    fludrocortisone (FLORINEF) tablet 400 mcg  400 mcg Oral DAILY    amiodarone (CORDARONE) tablet 100 mg  100 mg Oral DAILY    sodium chloride (NS) flush 5-10 mL  5-10 mL IntraVENous Q8H    sodium chloride (NS) flush 5-10 mL  5-10 mL IntraVENous PRN    aspirin delayed-release tablet 81 mg  81 mg Oral DAILY    levothyroxine (SYNTHROID) tablet 150 mcg  150 mcg Oral ACB    cyanocobalamin (VITAMIN B12) tablet 2,000 mcg  2,000 mcg Oral DAILY    primidone (MYSOLINE) tablet 250 mg  250 mg Oral BID    hydrocortisone (CORTAID) 1 % cream   Topical TID PRN    midodrine (PROAMITINE) tablet 10 mg  10 mg Oral TID WITH MEALS       Care Plan discussed with: Patient/Family, Nurse and     Total time spent with patient: 30 minutes.     Reyna Jaquez MD

## 2017-09-03 ENCOUNTER — APPOINTMENT (OUTPATIENT)
Dept: GENERAL RADIOLOGY | Age: 72
DRG: 309 | End: 2017-09-03
Attending: INTERNAL MEDICINE
Payer: MEDICARE

## 2017-09-03 LAB
ALBUMIN SERPL-MCNC: 2.9 G/DL (ref 3.5–5)
ALBUMIN/GLOB SERPL: 0.8 {RATIO} (ref 1.1–2.2)
ALP SERPL-CCNC: 77 U/L (ref 45–117)
ALT SERPL-CCNC: 15 U/L (ref 12–78)
ANION GAP SERPL CALC-SCNC: 10 MMOL/L (ref 5–15)
AST SERPL-CCNC: 11 U/L (ref 15–37)
ATRIAL RATE: 88 BPM
BASOPHILS # BLD: 0 K/UL (ref 0–0.1)
BASOPHILS NFR BLD: 0 % (ref 0–1)
BILIRUB SERPL-MCNC: 0.2 MG/DL (ref 0.2–1)
BUN SERPL-MCNC: 14 MG/DL (ref 6–20)
BUN/CREAT SERPL: 14 (ref 12–20)
CALCIUM SERPL-MCNC: 7.9 MG/DL (ref 8.5–10.1)
CALCULATED R AXIS, ECG10: 81 DEGREES
CALCULATED T AXIS, ECG11: -153 DEGREES
CHLORIDE SERPL-SCNC: 107 MMOL/L (ref 97–108)
CK SERPL-CCNC: 55 U/L (ref 39–308)
CO2 SERPL-SCNC: 25 MMOL/L (ref 21–32)
CREAT SERPL-MCNC: 0.99 MG/DL (ref 0.7–1.3)
D DIMER PPP FEU-MCNC: 1.66 MG/L FEU (ref 0–0.65)
DIAGNOSIS, 93000: NORMAL
EOSINOPHIL # BLD: 0.3 K/UL (ref 0–0.4)
EOSINOPHIL NFR BLD: 6 % (ref 0–7)
ERYTHROCYTE [DISTWIDTH] IN BLOOD BY AUTOMATED COUNT: 14.6 % (ref 11.5–14.5)
GLOBULIN SER CALC-MCNC: 3.5 G/DL (ref 2–4)
GLUCOSE BLD STRIP.AUTO-MCNC: 122 MG/DL (ref 65–100)
GLUCOSE SERPL-MCNC: 101 MG/DL (ref 65–100)
HCT VFR BLD AUTO: 34.8 % (ref 36.6–50.3)
HGB BLD-MCNC: 11.5 G/DL (ref 12.1–17)
LYMPHOCYTES # BLD: 1.6 K/UL (ref 0.8–3.5)
LYMPHOCYTES NFR BLD: 29 % (ref 12–49)
MAGNESIUM SERPL-MCNC: 1.8 MG/DL (ref 1.6–2.4)
MCH RBC QN AUTO: 30.3 PG (ref 26–34)
MCHC RBC AUTO-ENTMCNC: 33 G/DL (ref 30–36.5)
MCV RBC AUTO: 91.8 FL (ref 80–99)
MONOCYTES # BLD: 0.5 K/UL (ref 0–1)
MONOCYTES NFR BLD: 10 % (ref 5–13)
NEUTS SEG # BLD: 3 K/UL (ref 1.8–8)
NEUTS SEG NFR BLD: 55 % (ref 32–75)
PHOSPHATE SERPL-MCNC: 2.6 MG/DL (ref 2.6–4.7)
PLATELET # BLD AUTO: 172 K/UL (ref 150–400)
POTASSIUM SERPL-SCNC: 3.2 MMOL/L (ref 3.5–5.1)
PROT SERPL-MCNC: 6.4 G/DL (ref 6.4–8.2)
Q-T INTERVAL, ECG07: 318 MS
QRS DURATION, ECG06: 124 MS
QTC CALCULATION (BEZET), ECG08: 430 MS
RBC # BLD AUTO: 3.79 M/UL (ref 4.1–5.7)
SERVICE CMNT-IMP: ABNORMAL
SODIUM SERPL-SCNC: 142 MMOL/L (ref 136–145)
TROPONIN I SERPL-MCNC: <0.04 NG/ML
TSH SERPL DL<=0.05 MIU/L-ACNC: 1.13 UIU/ML (ref 0.36–3.74)
VENTRICULAR RATE, ECG03: 110 BPM
WBC # BLD AUTO: 5.4 K/UL (ref 4.1–11.1)

## 2017-09-03 PROCEDURE — 93005 ELECTROCARDIOGRAM TRACING: CPT

## 2017-09-03 PROCEDURE — 84484 ASSAY OF TROPONIN QUANT: CPT | Performed by: INTERNAL MEDICINE

## 2017-09-03 PROCEDURE — 71010 XR CHEST PORT: CPT

## 2017-09-03 PROCEDURE — 74011250637 HC RX REV CODE- 250/637: Performed by: INTERNAL MEDICINE

## 2017-09-03 PROCEDURE — 80053 COMPREHEN METABOLIC PANEL: CPT | Performed by: INTERNAL MEDICINE

## 2017-09-03 PROCEDURE — 74011250637 HC RX REV CODE- 250/637: Performed by: FAMILY MEDICINE

## 2017-09-03 PROCEDURE — 85379 FIBRIN DEGRADATION QUANT: CPT | Performed by: INTERNAL MEDICINE

## 2017-09-03 PROCEDURE — 82962 GLUCOSE BLOOD TEST: CPT

## 2017-09-03 PROCEDURE — 74011250636 HC RX REV CODE- 250/636: Performed by: FAMILY MEDICINE

## 2017-09-03 PROCEDURE — 83735 ASSAY OF MAGNESIUM: CPT | Performed by: INTERNAL MEDICINE

## 2017-09-03 PROCEDURE — 36415 COLL VENOUS BLD VENIPUNCTURE: CPT | Performed by: FAMILY MEDICINE

## 2017-09-03 PROCEDURE — 85025 COMPLETE CBC W/AUTO DIFF WBC: CPT | Performed by: INTERNAL MEDICINE

## 2017-09-03 PROCEDURE — 84443 ASSAY THYROID STIM HORMONE: CPT | Performed by: INTERNAL MEDICINE

## 2017-09-03 PROCEDURE — 82550 ASSAY OF CK (CPK): CPT | Performed by: INTERNAL MEDICINE

## 2017-09-03 PROCEDURE — 84100 ASSAY OF PHOSPHORUS: CPT | Performed by: INTERNAL MEDICINE

## 2017-09-03 PROCEDURE — 65660000000 HC RM CCU STEPDOWN

## 2017-09-03 RX ORDER — FAMOTIDINE 20 MG/1
20 TABLET, FILM COATED ORAL 2 TIMES DAILY
Status: DISCONTINUED | OUTPATIENT
Start: 2017-09-03 | End: 2017-09-06 | Stop reason: HOSPADM

## 2017-09-03 RX ORDER — TAMSULOSIN HYDROCHLORIDE 0.4 MG/1
0.4 CAPSULE ORAL
Status: DISCONTINUED | OUTPATIENT
Start: 2017-09-03 | End: 2017-09-06 | Stop reason: HOSPADM

## 2017-09-03 RX ORDER — POTASSIUM CHLORIDE 20MEQ/15ML
40 LIQUID (ML) ORAL ONCE
Status: ACTIVE | OUTPATIENT
Start: 2017-09-03 | End: 2017-09-03

## 2017-09-03 RX ORDER — NITROGLYCERIN 0.4 MG/1
0.4 TABLET SUBLINGUAL ONCE
Status: COMPLETED | OUTPATIENT
Start: 2017-09-03 | End: 2017-09-03

## 2017-09-03 RX ORDER — ASPIRIN 81 MG/1
162 TABLET ORAL
Status: COMPLETED | OUTPATIENT
Start: 2017-09-03 | End: 2017-09-03

## 2017-09-03 RX ORDER — ACETAMINOPHEN 325 MG/1
650 TABLET ORAL
Status: DISCONTINUED | OUTPATIENT
Start: 2017-09-03 | End: 2017-09-06 | Stop reason: HOSPADM

## 2017-09-03 RX ORDER — ENOXAPARIN SODIUM 100 MG/ML
40 INJECTION SUBCUTANEOUS EVERY 24 HOURS
Status: DISCONTINUED | OUTPATIENT
Start: 2017-09-03 | End: 2017-09-06 | Stop reason: HOSPADM

## 2017-09-03 RX ADMIN — SODIUM CHLORIDE 100 ML/HR: 900 INJECTION, SOLUTION INTRAVENOUS at 00:30

## 2017-09-03 RX ADMIN — MIDODRINE HYDROCHLORIDE 10 MG: 5 TABLET ORAL at 12:18

## 2017-09-03 RX ADMIN — MIDODRINE HYDROCHLORIDE 10 MG: 5 TABLET ORAL at 17:11

## 2017-09-03 RX ADMIN — AMIODARONE HYDROCHLORIDE 100 MG: 200 TABLET ORAL at 08:34

## 2017-09-03 RX ADMIN — Medication 2000 MCG: at 08:33

## 2017-09-03 RX ADMIN — FAMOTIDINE 20 MG: 20 TABLET ORAL at 10:06

## 2017-09-03 RX ADMIN — PRIMIDONE 250 MG: 250 TABLET ORAL at 17:35

## 2017-09-03 RX ADMIN — NITROGLYCERIN 0.4 MG: 0.4 TABLET SUBLINGUAL at 07:36

## 2017-09-03 RX ADMIN — PRIMIDONE 250 MG: 250 TABLET ORAL at 08:42

## 2017-09-03 RX ADMIN — Medication 10 ML: at 14:00

## 2017-09-03 RX ADMIN — ACETAMINOPHEN 650 MG: 325 TABLET, FILM COATED ORAL at 12:24

## 2017-09-03 RX ADMIN — FAMOTIDINE 20 MG: 20 TABLET ORAL at 17:11

## 2017-09-03 RX ADMIN — Medication 10 ML: at 21:29

## 2017-09-03 RX ADMIN — Medication 10 ML: at 07:36

## 2017-09-03 RX ADMIN — ASPIRIN 81 MG: 81 TABLET, COATED ORAL at 08:35

## 2017-09-03 RX ADMIN — SODIUM CHLORIDE 100 ML/HR: 900 INJECTION, SOLUTION INTRAVENOUS at 11:19

## 2017-09-03 RX ADMIN — FLUDROCORTISONE ACETATE 400 MCG: 0.1 TABLET ORAL at 08:34

## 2017-09-03 RX ADMIN — TAMSULOSIN HYDROCHLORIDE 0.4 MG: 0.4 CAPSULE ORAL at 21:29

## 2017-09-03 RX ADMIN — MIDODRINE HYDROCHLORIDE 10 MG: 5 TABLET ORAL at 07:36

## 2017-09-03 RX ADMIN — ASPIRIN 162 MG: 81 TABLET, COATED ORAL at 07:35

## 2017-09-03 RX ADMIN — LEVOTHYROXINE SODIUM 150 MCG: 150 TABLET ORAL at 07:35

## 2017-09-03 NOTE — PROGRESS NOTES
At [address] was notified by the patient care tech that patient was complaining of chest pain. Patient's pain was 10/10 pressure, and felt like pain he had with MI. Charge nurse in room with nurse called rapid response. Nurse obtained vitals, 101/71 109 HR. EKG obtained and showed A-fib, patient has hx A-fib. Cardiology consult ordered. Labs drawn. On call hospitalist came and saw the patient. CXR ordered and obtained. Nitro not given due to B/P, MD was going to order morphine but allergic. MD stated to apply SCDs. Nurse applied SCD's and will continue to monitor patient.

## 2017-09-03 NOTE — PROGRESS NOTES
Hospitalist Progress Note          Coy Gaxiola MD  Please call  and page for questions. Call physician on-call through the  7pm-7am    Daily Progress Note: 9/3/2017    Primary care Andrade Avila MD    Date of admission: 8/30/2017  7:25 PM    Admission summery and hospital course:  67 yom with pmh of CAD, MI, afib, s/p PPM, PTCA, stent,heart failure, stroke, UTI, orthostatic hypotension, Hodgkin lymphoma s/p chemo, diverticulitis, hemorrhoids, heart murmur, GI bleed, tremors who present from Dr Kourtney Roland [facility] nephrologist) for hypotension. BP dropped to 75/40 in office so he was sent to ED for further eval. Patient has h/o orthostatic hypotension for >4 yrs and has had extensive w/u with cardiology, neurology, and nephrology at [address] [place] [address]. Subjective:   Patient said is feeling better today. Assessment/Plan:   Chest pain:  A-fib with RVR on EKG. Manual HR is 105. Troponin negative, will trend. D dimer elevated. Patient is allergic to contrast. Will get VQ scan to evaluate further. Will transfer the patient to tele. Will consult cardiology. Patient has tenderness at the lower sternum, will do tylenol, Pepcid and warm pack as necessary. Hold therapeutic anticoagulant for now and monitor clinically and lab. Orthostatic Hypotension; On 09/02/2017 at 10 AM, SBP was 110 on supine and 65 on standing. Patient was dizzy while standing.   -Continue home Midodrine and Florinef has been increased to 0.4mg  -Patient said he used to get 2 liters of IV NS every other day at his PCP. -We will do NS at the rate of 100/hour monitor closely.  -Pulmonary edema on CXR on admission.   -Patient refused for thigh high RODRIGO hose, patient does not want to use it.  Patient said he does not tolerate sodium tab.    -echo ef 55-60% no wall abnormalities       Hypothyroidism   -tsh wnl   -continue home levothyroxine      Difficulty with ambulating   -Continue PT/OT as he tolerates.       Resting tremor  -resume home meds      Fluid/Electrolytes/Nutirotion:   Will replace potassium and monitor. Patient said he does not tolerate sodium salt.       Persistent a-fib: Will continue amiodarone.       See orders for other plans. VTE prophylaxis: Lovenox and pepcid. Code status: Full  Discussed plan of care with Patient/Family and Nurse. Pre-admission lived at home. Discharge planning: pending. Review of Systems:     Review of Systems:  Symptom  Y/N  Comments   Symptom  Y/N  Comments    Fever/Chills   n   Chest Pain  y Getting better now. Poor Appetite  n    Edema  n     Cough  n   Abdominal Pain  n     Sputum  n   Joint Pain      SOB/SHELL  y   Pruritis/Rash      Nausea/vomit  n   Tolerating PT/OT      Diarrhea     Tolerating Diet  y    Constipation     Other      Could not obtain due to:         Objective:   Physical Exam:     Visit Vitals    /68    Pulse 96    Temp 97.9 °F (36.6 °C)    Resp 14    Ht 6' 2\" (1.88 m)    Wt 67.5 kg (148 lb 12.8 oz)    SpO2 98%    BMI 19.1 kg/m2      O2 Device: Room air    Temp (24hrs), Av.3 °F (36.8 °C), Min:97.7 °F (36.5 °C), Max:98.9 °F (37.2 °C)         1901 -  0700  In: 950 [P.O.:950]  Out: 2250 [Urine:2250]      General:  Alert, cooperative, no distress, appears stated age. Lungs:   Clear to auscultation bilaterally. Chest wall:  Tenderness a the lower sternum, no deformity. Pacemaker at the left upper chest without tenderness and or discharge. Heart:  Irregular rate and rhythm, S1, S2 normal.    Abdomen:   Soft, non-tender. Bowel sounds normal.    Extremities: Extremities normal, atraumatic, no cyanosis or edema. Pulses: 2+ and symmetric all extremities. Skin: Skin color, texture, turgor normal. No rashes or lesions   Neurologic: CNII-XII intact.       Data Review:       Recent Days:  Recent Labs      17   0404   WBC  5.4   HGB  11.5*   HCT  34.8*   PLT  172     Recent Labs      17 0404   NA  142   K  3.2*   CL  107   CO2  25   GLU  101*   BUN  14   CREA  0.99   CA  7.9*   MG  1.8   PHOS  2.6   ALB  2.9*   SGOT  11*   ALT  15     No results for input(s): PH, PCO2, PO2, HCO3, FIO2 in the last 72 hours. 24 Hour Results:  Recent Results (from the past 24 hour(s))   EKG, 12 LEAD, INITIAL    Collection Time: 09/03/17  3:54 AM   Result Value Ref Range    Ventricular Rate 110 BPM    Atrial Rate 88 BPM    QRS Duration 124 ms    Q-T Interval 318 ms    QTC Calculation (Bezet) 430 ms    Calculated R Axis 81 degrees    Calculated T Axis -153 degrees    Diagnosis       ** Poor data quality, interpretation may be adversely affected  Atrial fibrillation with rapid ventricular response  Nonspecific intraventricular conduction delay  ST & T wave abnormality, consider inferior ischemia or digitalis effect  When compared with ECG of 30-AUG-2017 19:44,  Atrial fibrillation has replaced Electronic ventricular pacemaker     D DIMER    Collection Time: 09/03/17  4:04 AM   Result Value Ref Range    D-dimer 1.66 (H) 0.00 - 0.65 mg/L FEU   CK    Collection Time: 09/03/17  4:04 AM   Result Value Ref Range    CK 55 39 - 308 U/L   TROPONIN I    Collection Time: 09/03/17  4:04 AM   Result Value Ref Range    Troponin-I, Qt. <0.04 <9.63 ng/mL   METABOLIC PANEL, COMPREHENSIVE    Collection Time: 09/03/17  4:04 AM   Result Value Ref Range    Sodium 142 136 - 145 mmol/L    Potassium 3.2 (L) 3.5 - 5.1 mmol/L    Chloride 107 97 - 108 mmol/L    CO2 25 21 - 32 mmol/L    Anion gap 10 5 - 15 mmol/L    Glucose 101 (H) 65 - 100 mg/dL    BUN 14 6 - 20 MG/DL    Creatinine 0.99 0.70 - 1.30 MG/DL    BUN/Creatinine ratio 14 12 - 20      GFR est AA >60 >60 ml/min/1.73m2    GFR est non-AA >60 >60 ml/min/1.73m2    Calcium 7.9 (L) 8.5 - 10.1 MG/DL    Bilirubin, total 0.2 0.2 - 1.0 MG/DL    ALT (SGPT) 15 12 - 78 U/L    AST (SGOT) 11 (L) 15 - 37 U/L    Alk.  phosphatase 77 45 - 117 U/L    Protein, total 6.4 6.4 - 8.2 g/dL    Albumin 2.9 (L) 3.5 - 5.0 g/dL    Globulin 3.5 2.0 - 4.0 g/dL    A-G Ratio 0.8 (L) 1.1 - 2.2     CBC WITH AUTOMATED DIFF    Collection Time: 09/03/17  4:04 AM   Result Value Ref Range    WBC 5.4 4.1 - 11.1 K/uL    RBC 3.79 (L) 4.10 - 5.70 M/uL    HGB 11.5 (L) 12.1 - 17.0 g/dL    HCT 34.8 (L) 36.6 - 50.3 %    MCV 91.8 80.0 - 99.0 FL    MCH 30.3 26.0 - 34.0 PG    MCHC 33.0 30.0 - 36.5 g/dL    RDW 14.6 (H) 11.5 - 14.5 %    PLATELET 255 093 - 859 K/uL    NEUTROPHILS 55 32 - 75 %    LYMPHOCYTES 29 12 - 49 %    MONOCYTES 10 5 - 13 %    EOSINOPHILS 6 0 - 7 %    BASOPHILS 0 0 - 1 %    ABS. NEUTROPHILS 3.0 1.8 - 8.0 K/UL    ABS. LYMPHOCYTES 1.6 0.8 - 3.5 K/UL    ABS. MONOCYTES 0.5 0.0 - 1.0 K/UL    ABS. EOSINOPHILS 0.3 0.0 - 0.4 K/UL    ABS.  BASOPHILS 0.0 0.0 - 0.1 K/UL   MAGNESIUM    Collection Time: 09/03/17  4:04 AM   Result Value Ref Range    Magnesium 1.8 1.6 - 2.4 mg/dL   PHOSPHORUS    Collection Time: 09/03/17  4:04 AM   Result Value Ref Range    Phosphorus 2.6 2.6 - 4.7 MG/DL   TSH 3RD GENERATION    Collection Time: 09/03/17  4:04 AM   Result Value Ref Range    TSH 1.13 0.36 - 3.74 uIU/mL   GLUCOSE, POC    Collection Time: 09/03/17  4:10 AM   Result Value Ref Range    Glucose (POC) 122 (H) 65 - 100 mg/dL    Performed by Rebekah Galvan        Problem List:  Problem List as of 9/3/2017  Date Reviewed: 8/31/2017          Codes Class Noted - Resolved    Acute hyperkalemia ICD-10-CM: E87.5  ICD-9-CM: 276.7  8/31/2017 - Present        Pulmonary edema ICD-10-CM: J81.1  ICD-9-CM: 066  8/31/2017 - Present        * (Principal)Orthostatic hypotension ICD-10-CM: I95.1  ICD-9-CM: 458.0  8/31/2017 - Present        Gait instability ICD-10-CM: R26.81  ICD-9-CM: 781.2  8/31/2017 - Present              Medications reviewed  Current Facility-Administered Medications   Medication Dose Route Frequency    potassium chloride (KAON 10%) 20 mEq/15 mL oral liquid 40 mEq  40 mEq Oral ONCE    0.9% sodium chloride infusion  100 mL/hr IntraVENous CONTINUOUS    ondansetron (ZOFRAN) injection 4 mg  4 mg IntraVENous Q6H PRN    fludrocortisone (FLORINEF) tablet 400 mcg  400 mcg Oral DAILY    amiodarone (CORDARONE) tablet 100 mg  100 mg Oral DAILY    sodium chloride (NS) flush 5-10 mL  5-10 mL IntraVENous Q8H    sodium chloride (NS) flush 5-10 mL  5-10 mL IntraVENous PRN    aspirin delayed-release tablet 81 mg  81 mg Oral DAILY    levothyroxine (SYNTHROID) tablet 150 mcg  150 mcg Oral ACB    cyanocobalamin (VITAMIN B12) tablet 2,000 mcg  2,000 mcg Oral DAILY    primidone (MYSOLINE) tablet 250 mg  250 mg Oral BID    hydrocortisone (CORTAID) 1 % cream   Topical TID PRN    midodrine (PROAMITINE) tablet 10 mg  10 mg Oral TID WITH MEALS       Care Plan discussed with: Patient/Family and Nurse    Total time spent with patient: 30 minutes.     Sunita Cross MD

## 2017-09-03 NOTE — PROGRESS NOTES
RRT called for patient because of chest pain. This is located at left side of chest, no radiation, 10/10 in severity, sharp and constant. No known aggravating or relieving factors . Known patient with established CAD and A-fib. EKG showed a-fib.   HEENT: Normocephalic, atraumatic   Chest: clear breath sounds  Heart: Normal S1 and S2 irregular  Abdomen: soft, non tender, normal BS  CNS: Alert, oriented X 3  Ext: no edema, pulses 2+  A/P: Chest pain  Will check cardiac markers, mag, phos, TSH, D-dimer  Cardiology consult

## 2017-09-03 NOTE — PROGRESS NOTES
09:35 TRANSFER - IN REPORT:    Verbal report received from [address] [place] [address] Patel RN(name) on Adalid Schneider  being received from [facility](unit) for change in patient condition(chest pain)      Report consisted of patients Situation, Background, Assessment and   Recommendations(SBAR). Information from the following report(s) SBAR, Kardex and Recent Results was reviewed with the receiving nurse. Opportunity for questions and clarification was provided. Assessment completed upon patients arrival to unit and care assumed. 20:00 Primary Nurse Gabriella Cedillo RN and Saranya Rubio RN performed a dual skin assessment on this patient No impairment noted  Nico score is 20  Bedside shift change report given to Saranya Rubio RN (oncoming nurse) by Mingo Ann RN (offgoing nurse). Report included the following information SBAR, Kardex and Recent Results.

## 2017-09-03 NOTE — CONSULTS
9/3/2017    Cardiology Consult  Note   [facility] M.D[facility] , F.A.CUrielC.   --------PCP:-Haseeb Garg MD   -----Subjective:    Laurie Hernandez is a 67 y.o. male   Consult requested from Hospitalist/Internal Medicine team  for chest pain, afib  Had chest pain this am with rapid response due to complains of 10/10 chest pain, given NTG, aspirin and KCl  VQ scan ordered ( not yet done, is allergic to contrast), with chest pain tender at lower sternum per notes  Currently he points to upper sternum and right medial clavicle as painful and not changed, also reports long standing pain across from shoulder to shoulder      Has severe orthostatic hypotension on midodrine and Florinef, usually sees his doctor every month or two ( not every other day)  for NS infusion in Dr Pat Amezcua office  Feliz on 8-30 and had persistent low bp at 75 even after fluids and admitted-got diverted from [facility] to [facility]  He has ongoing chest pain , pacer, and is amiodarone as well  He remains hypotensive,   Echo 50-55% here   Initial EKG showed P waves with atrial sensing and V pacing on 8-29  Then EKG 8-30 with atrial flutter with LBBB and V paced beats  EKG today with afib at rate 110  Cardiac meds at home on amiod 200mg day, ASA 81 mg , Florineft 0.2 day, midodrine 10 mg TID, or tremor is on primidone 250 mg bid   UA neg on admit, labs unremarkable  CXR clear  Social history notable for former smoker while young ,not for many decades,  Family history notable for early CAD in biologic family non specific, pt is adopted    No old recs on this admit but last admit we have some notes summarized below:  CAD with LAD stent 2012/ Dr Maci Riggs [facility]  Cardiac arrest Sept 2012  AICD with CRT 9-24-12 Dr Marissa Morrison [facility], had ?pericardial effusion , transfer to [facility]  CHF systolic  --EF 14% 6/6257 Dr Gasper Russell [facility]  CATH 5/2015- RHC/LHC  non occlusive CAD with patent LAD stent and normal right and left sided pressures Dr GARIBAY [facility]-has BiV AICD, not taking BB or ACE due to severe postural hypotension syndrome  PAF on amiodarone   POTS fu with Dr Feliberto Navarro Tremor  Hypothyroid  Blood stools 2016 and abdominal pain     Discussion/Plan/Impression:  PAF for years on amiodarone  Has paroxysmal afib flutter susepcted, so rhythm has not changed from baseline but rate nwo higher at 110 so no longer V pacing , seems controlled    Chest pain and clavicle pain, seems atypical for ischemia, check enzymes, given his history of CAD , will evaluate based on enzymes, not clear if stress test is possible given his changes in blood pressure perhaps he could tolerate a lexiscan    POTS  -severe but likely managable, no obvous worsening factor, no infection, change in environment, goal to return pt to baseline   Continue current doses of midodrine and Florinef, suggest more of a bolus when low rather than constant infusion  --agree restart Flomax    CAD s/p LAD stent-cp as above only on aspirin  ----Not on statin, Pt factors    CHF, no BB,ACE due to low bp  --EF now normal on latest echo  ---CRT AICD BiV [employer]tr[employer], check if any arrhythmia , can be done Tuesday   --no edema or rales    Systolic murmur--unclear , says is chronic, likely sclerosis, contemporary echo read as not valve disease    Hx of GI bleed,   Lab Results   Component Value Date/Time    HGB 11.5 09/03/2017 04:04 AM    is stable    following     Cardiac Studies/Hx:  No specialty comments available.          Past Medical History:   Diagnosis Date    AMI (acute myocardial infarction) ([facility] [address]     Atrial fibrillation (HCC)     Colonic polyp     Diverticulosis     Heart failure (HCC)     Heart murmur     Hemorrhoid     Hodgkin lymphoma (HCC)     Orthostatic hypotension     Rectal bleeding     Stroke (HCC)     Tremor     UTI (urinary tract infection)         ROS-pertinents  negative except as above  The pertinent portions of the medical history,physician and nursing notes, meds,vitals , labs and Ins/Outs,are reviewed in the electronic record. Social History   Substance Use Topics    Smoking status: Former Smoker    Smokeless tobacco: None    Alcohol use No      History reviewed. No pertinent family history. Prior to Admission Medications   Prescriptions Last Dose Informant Patient Reported? Taking?   amiodarone (CORDARONE) 200 mg tablet   Yes No   Sig: Take 200 mg by mouth daily. aspirin delayed-release 81 mg tablet   Yes No   Sig: Take 81 mg by mouth daily. cyanocobalamin 1,000 mcg tablet   Yes No   Sig: Take 2,000 mcg by mouth daily. fludrocortisone (FLORINEF) 0.1 mg tablet   Yes No   Sig: Take 0.2 mg by mouth daily. levothyroxine (SYNTHROID) 150 mcg tablet   Yes No   Sig: Take 150 mcg by mouth Daily (before breakfast). midodrine (PROAMITINE) 10 mg tablet   Yes No   Sig: Take 10 mg by mouth three (3) times daily. nitroglycerin (NITROSTAT) 0.4 mg SL tablet   Yes No   Si.4 mg by SubLINGual route every five (5) minutes as needed for Chest Pain. primidone (MYSOLINE) 250 mg tablet   Yes No   Sig: Take 250 mg by mouth two (2) times a day. tamsulosin (FLOMAX) 0.4 mg capsule   Yes No   Sig: Take 0.4 mg by mouth nightly.       Facility-Administered Medications: None     Allergies   Allergen Reactions    Latex Rash    Dilantin [Phenytoin Sodium Extended] Other (comments)     \"makes me violent\"    Iodinated Contrast- Oral And Iv Dye Rash    Morphine Other (comments)     \"makes me violent\"    Pcn [Penicillins] Rash    Sulfa (Sulfonamide Antibiotics) Rash      Objective:    Physical Exam:   Patient Vitals for the past 12 hrs:   Temp Pulse Resp BP SpO2   17 1217 98.1 °F (36.7 °C) 81 18 137/64 100 %   17 0953 97.9 °F (36.6 °C) 80 16 138/61 99 %   17 0812 98 °F (36.7 °C) 93 16 129/84 98 %   17 0356 - 96 - 101/68 98 %   17 0345 - (!) 109 - 101/71 96 %      General:  alert, cooperative, no distress, appears stated age   [de-identified], Neck:  NC,AT- no JVD   Chest Wall: inspection normal - no chest wall deformities or tenderness, respiratory effort normal   Lung:   clear to auscultation bilaterally   Heart:    normal rate, regular rhythm, normal S1, S2, no  rubs, clicks or gallops  2/6 systolic early/decresendo  murmur at RUSB to LUSB without radiation beyond apex     Abdomen: nondistended   Extremities: extremities normal, atraumatic, no cyanosis or edema   Neuro -marked tremor of hands  Skin no rash  M/S cachexia to muscle mass  Psych- affect and mood appropriate and pleasant  Last 24hr Input/Output:    Intake/Output Summary (Last 24 hours) at 09/03/17 1220  Last data filed at 09/03/17 0953   Gross per 24 hour   Intake             1880 ml   Output             1850 ml   Net               30 ml        Data Review:   Recent Results (from the past 24 hour(s))   EKG, 12 LEAD, INITIAL    Collection Time: 09/03/17  3:54 AM   Result Value Ref Range    Ventricular Rate 110 BPM    Atrial Rate 88 BPM    QRS Duration 124 ms    Q-T Interval 318 ms    QTC Calculation (Bezet) 430 ms    Calculated R Axis 81 degrees    Calculated T Axis -153 degrees    Diagnosis       ** Poor data quality, interpretation may be adversely affected  Atrial fibrillation with rapid ventricular response  Nonspecific intraventricular conduction delay  ST & T wave abnormality, consider inferior ischemia or digitalis effect  When compared with ECG of 30-AUG-2017 19:44,  Atrial fibrillation has replaced Electronic ventricular pacemaker     D DIMER    Collection Time: 09/03/17  4:04 AM   Result Value Ref Range    D-dimer 1.66 (H) 0.00 - 0.65 mg/L FEU   CK    Collection Time: 09/03/17  4:04 AM   Result Value Ref Range    CK 55 39 - 308 U/L   TROPONIN I    Collection Time: 09/03/17  4:04 AM   Result Value Ref Range    Troponin-I, Qt. <0.04 <8.17 ng/mL   METABOLIC PANEL, COMPREHENSIVE    Collection Time: 09/03/17  4:04 AM   Result Value Ref Range    Sodium 142 136 - 145 mmol/L    Potassium 3.2 (L) 3.5 - 5.1 mmol/L    Chloride 107 97 - 108 mmol/L    CO2 25 21 - 32 mmol/L    Anion gap 10 5 - 15 mmol/L    Glucose 101 (H) 65 - 100 mg/dL    BUN 14 6 - 20 MG/DL    Creatinine 0.99 0.70 - 1.30 MG/DL    BUN/Creatinine ratio 14 12 - 20      GFR est AA >60 >60 ml/min/1.73m2    GFR est non-AA >60 >60 ml/min/1.73m2    Calcium 7.9 (L) 8.5 - 10.1 MG/DL    Bilirubin, total 0.2 0.2 - 1.0 MG/DL    ALT (SGPT) 15 12 - 78 U/L    AST (SGOT) 11 (L) 15 - 37 U/L    Alk. phosphatase 77 45 - 117 U/L    Protein, total 6.4 6.4 - 8.2 g/dL    Albumin 2.9 (L) 3.5 - 5.0 g/dL    Globulin 3.5 2.0 - 4.0 g/dL    A-G Ratio 0.8 (L) 1.1 - 2.2     CBC WITH AUTOMATED DIFF    Collection Time: 09/03/17  4:04 AM   Result Value Ref Range    WBC 5.4 4.1 - 11.1 K/uL    RBC 3.79 (L) 4.10 - 5.70 M/uL    HGB 11.5 (L) 12.1 - 17.0 g/dL    HCT 34.8 (L) 36.6 - 50.3 %    MCV 91.8 80.0 - 99.0 FL    MCH 30.3 26.0 - 34.0 PG    MCHC 33.0 30.0 - 36.5 g/dL    RDW 14.6 (H) 11.5 - 14.5 %    PLATELET 509 840 - 338 K/uL    NEUTROPHILS 55 32 - 75 %    LYMPHOCYTES 29 12 - 49 %    MONOCYTES 10 5 - 13 %    EOSINOPHILS 6 0 - 7 %    BASOPHILS 0 0 - 1 %    ABS. NEUTROPHILS 3.0 1.8 - 8.0 K/UL    ABS. LYMPHOCYTES 1.6 0.8 - 3.5 K/UL    ABS. MONOCYTES 0.5 0.0 - 1.0 K/UL    ABS. EOSINOPHILS 0.3 0.0 - 0.4 K/UL    ABS.  BASOPHILS 0.0 0.0 - 0.1 K/UL   MAGNESIUM    Collection Time: 09/03/17  4:04 AM   Result Value Ref Range    Magnesium 1.8 1.6 - 2.4 mg/dL   PHOSPHORUS    Collection Time: 09/03/17  4:04 AM   Result Value Ref Range    Phosphorus 2.6 2.6 - 4.7 MG/DL   TSH 3RD GENERATION    Collection Time: 09/03/17  4:04 AM   Result Value Ref Range    TSH 1.13 0.36 - 3.74 uIU/mL   GLUCOSE, POC    Collection Time: 09/03/17  4:10 AM   Result Value Ref Range    Glucose (POC) 122 (H) 65 - 100 mg/dL    Performed by Randell Chilel    TROPONIN I    Collection Time: 09/03/17 10:38 AM   Result Value Ref Range    Troponin-I, Qt. <0.04 <0.05 ng/mL      No results found for: CHOL, CHOLX, CHLST, Scott Valdovinos, HDL, LDL, LDLC, DLDLP, Hernando Betts, [facility] [place], Gissel Higginbotham MD 9/3/2017

## 2017-09-03 NOTE — PROGRESS NOTES
Bedside and Verbal shift change report given to Nena Frazier RN  (oncoming nurse) by Juan Luis Phipps RN  (offgoing nurse). Report included the following information SBAR.

## 2017-09-03 NOTE — PROGRESS NOTES
TRANSFER - OUT REPORT:    Verbal report given to Jessica CRYSTAL(name) on [facility]  being transferred to [facility](unit) for routine progression of care       Report consisted of patients Situation, Background, Assessment and   Recommendations(SBAR). Information from the following report(s) SBAR, Kardex, [facility] and Recent Results was reviewed with the receiving nurse. Lines:   Peripheral IV 08/30/17 Left Hand (Active)   Site Assessment Clean, dry, & intact 9/3/2017  4:30 AM   Phlebitis Assessment 0 9/3/2017  4:30 AM   Infiltration Assessment 0 9/3/2017  4:30 AM   Dressing Status Clean, dry, & intact 9/3/2017  4:30 AM   Dressing Type Transparent;Tape 9/3/2017  4:30 AM   Hub Color/Line Status Blue; Infusing 9/3/2017  4:30 AM   Action Taken Open ports on tubing capped 9/2/2017  8:20 PM   Alcohol Cap Used Yes 9/3/2017  4:30 AM        Opportunity for questions and clarification was provided.       Patient transported with:   Registered Nurse  Tech

## 2017-09-03 NOTE — PROGRESS NOTES
Patient still c/o chest pain 10/10. Nurse called and spoke with Dr. Richy Soliman. Nurse informed him of rapid response, continued chest pain, EKG results, and D-dimer of 1.66. MD ordered to give aspirin 162 mg once, nitro 0.4 sublingual once, 40 meq potassium oral. MD also stated he was going to pass along to Dr. Radha Ng and let him decide if he wants a VQ scan since patient allergic to contrast and can not get a stat CTA to r/o PE. Nurse administered medication and will continue to monitor patient.

## 2017-09-03 NOTE — PROGRESS NOTES
Bedside and Verbal shift change report given to Andre Martin RN (oncoming nurse) by Nuno Cantu RN (offgoing nurse). Report included the following information SBAR, Kardex, ED Summary, Intake/Output, MAR and Recent Results.

## 2017-09-03 NOTE — PROGRESS NOTES
I was called by nurse for cp at 7.04am  Dr. Gabriel Diaz had earlier ordered cardiac enzymes, ekg and d-dimer. Gave aspirin 162 mg stat, ntg and kcl  D/w attending physician about further management and he promised to take care of pt right away.

## 2017-09-03 NOTE — PROGRESS NOTES
Problem: Falls - Risk of  Goal: *Absence of Falls  Document Basil Fall Risk and appropriate interventions in the flowsheet. Outcome: Progressing Towards Goal  Fall Risk Interventions:  Mobility Interventions: Patient to call before getting OOB           Medication Interventions: Patient to call before getting OOB     Elimination Interventions: Call light in reach     History of Falls Interventions: Door open when patient unattended           Problem: Hypotension  Goal: *Blood pressure within specified parameters  Outcome: Progressing Towards Goal  Patient with normal blood pressure throughout shift.

## 2017-09-04 LAB
ALBUMIN SERPL-MCNC: 3.1 G/DL (ref 3.5–5)
ALBUMIN/GLOB SERPL: 0.9 {RATIO} (ref 1.1–2.2)
ALP SERPL-CCNC: 78 U/L (ref 45–117)
ALT SERPL-CCNC: 13 U/L (ref 12–78)
ANION GAP SERPL CALC-SCNC: 7 MMOL/L (ref 5–15)
AST SERPL-CCNC: 14 U/L (ref 15–37)
BACTERIA SPEC CULT: NORMAL
BASOPHILS # BLD: 0 K/UL (ref 0–0.1)
BASOPHILS NFR BLD: 0 % (ref 0–1)
BILIRUB SERPL-MCNC: 0.2 MG/DL (ref 0.2–1)
BUN SERPL-MCNC: 13 MG/DL (ref 6–20)
BUN/CREAT SERPL: 13 (ref 12–20)
CALCIUM SERPL-MCNC: 8.2 MG/DL (ref 8.5–10.1)
CHLORIDE SERPL-SCNC: 108 MMOL/L (ref 97–108)
CO2 SERPL-SCNC: 27 MMOL/L (ref 21–32)
CREAT SERPL-MCNC: 0.98 MG/DL (ref 0.7–1.3)
EOSINOPHIL # BLD: 0.3 K/UL (ref 0–0.4)
EOSINOPHIL NFR BLD: 6 % (ref 0–7)
ERYTHROCYTE [DISTWIDTH] IN BLOOD BY AUTOMATED COUNT: 14.4 % (ref 11.5–14.5)
GLOBULIN SER CALC-MCNC: 3.5 G/DL (ref 2–4)
GLUCOSE SERPL-MCNC: 101 MG/DL (ref 65–100)
HCT VFR BLD AUTO: 34.1 % (ref 36.6–50.3)
HGB BLD-MCNC: 11.4 G/DL (ref 12.1–17)
LYMPHOCYTES # BLD: 1.4 K/UL (ref 0.8–3.5)
LYMPHOCYTES NFR BLD: 25 % (ref 12–49)
MCH RBC QN AUTO: 30.7 PG (ref 26–34)
MCHC RBC AUTO-ENTMCNC: 33.4 G/DL (ref 30–36.5)
MCV RBC AUTO: 91.9 FL (ref 80–99)
MONOCYTES # BLD: 0.7 K/UL (ref 0–1)
MONOCYTES NFR BLD: 13 % (ref 5–13)
NEUTS SEG # BLD: 3.1 K/UL (ref 1.8–8)
NEUTS SEG NFR BLD: 56 % (ref 32–75)
PLATELET # BLD AUTO: 172 K/UL (ref 150–400)
POTASSIUM SERPL-SCNC: 3.4 MMOL/L (ref 3.5–5.1)
PROT SERPL-MCNC: 6.6 G/DL (ref 6.4–8.2)
RBC # BLD AUTO: 3.71 M/UL (ref 4.1–5.7)
SERVICE CMNT-IMP: NORMAL
SODIUM SERPL-SCNC: 142 MMOL/L (ref 136–145)
WBC # BLD AUTO: 5.5 K/UL (ref 4.1–11.1)

## 2017-09-04 PROCEDURE — 74011250636 HC RX REV CODE- 250/636: Performed by: SPECIALIST

## 2017-09-04 PROCEDURE — 65660000000 HC RM CCU STEPDOWN

## 2017-09-04 PROCEDURE — 80053 COMPREHEN METABOLIC PANEL: CPT | Performed by: FAMILY MEDICINE

## 2017-09-04 PROCEDURE — 74011250637 HC RX REV CODE- 250/637: Performed by: SPECIALIST

## 2017-09-04 PROCEDURE — 74011250637 HC RX REV CODE- 250/637: Performed by: FAMILY MEDICINE

## 2017-09-04 PROCEDURE — 36415 COLL VENOUS BLD VENIPUNCTURE: CPT | Performed by: FAMILY MEDICINE

## 2017-09-04 PROCEDURE — 85025 COMPLETE CBC W/AUTO DIFF WBC: CPT | Performed by: FAMILY MEDICINE

## 2017-09-04 RX ORDER — FLUDROCORTISONE ACETATE 0.1 MG/1
200 TABLET ORAL EVERY EVENING
Status: DISCONTINUED | OUTPATIENT
Start: 2017-09-04 | End: 2017-09-06 | Stop reason: HOSPADM

## 2017-09-04 RX ADMIN — Medication 2000 MCG: at 08:25

## 2017-09-04 RX ADMIN — FAMOTIDINE 20 MG: 20 TABLET ORAL at 18:14

## 2017-09-04 RX ADMIN — MIDODRINE HYDROCHLORIDE 10 MG: 5 TABLET ORAL at 16:00

## 2017-09-04 RX ADMIN — LEVOTHYROXINE SODIUM 150 MCG: 150 TABLET ORAL at 06:59

## 2017-09-04 RX ADMIN — PRIMIDONE 250 MG: 250 TABLET ORAL at 18:14

## 2017-09-04 RX ADMIN — Medication 10 ML: at 06:59

## 2017-09-04 RX ADMIN — FLUDROCORTISONE ACETATE 400 MCG: 0.1 TABLET ORAL at 08:25

## 2017-09-04 RX ADMIN — MIDODRINE HYDROCHLORIDE 10 MG: 5 TABLET ORAL at 08:25

## 2017-09-04 RX ADMIN — FAMOTIDINE 20 MG: 20 TABLET ORAL at 08:25

## 2017-09-04 RX ADMIN — Medication 10 ML: at 21:05

## 2017-09-04 RX ADMIN — ACETAMINOPHEN 650 MG: 325 TABLET, FILM COATED ORAL at 18:13

## 2017-09-04 RX ADMIN — Medication 10 ML: at 14:00

## 2017-09-04 RX ADMIN — TAMSULOSIN HYDROCHLORIDE 0.4 MG: 0.4 CAPSULE ORAL at 20:55

## 2017-09-04 RX ADMIN — FLUDROCORTISONE ACETATE 200 MCG: 0.1 TABLET ORAL at 18:14

## 2017-09-04 RX ADMIN — MIDODRINE HYDROCHLORIDE 10 MG: 5 TABLET ORAL at 11:17

## 2017-09-04 RX ADMIN — ASPIRIN 81 MG: 81 TABLET, COATED ORAL at 08:25

## 2017-09-04 RX ADMIN — AMIODARONE HYDROCHLORIDE 100 MG: 200 TABLET ORAL at 08:25

## 2017-09-04 RX ADMIN — PRIMIDONE 250 MG: 250 TABLET ORAL at 08:26

## 2017-09-04 RX ADMIN — SODIUM CHLORIDE 1000 ML: 900 INJECTION, SOLUTION INTRAVENOUS at 12:12

## 2017-09-04 NOTE — PROGRESS NOTES
[facility]  -Progress Note     [phone]   9/4/2017      Meri Jefferson M.D. , F.A.C.C.   --------PCP:-Haseeb Calix MD   -----Subjective:   . [place] Dials Pete Donald is a 67 y.o. male   Chest pain yesterday, is chronic  Trop neg x 3  EF 55-60% echo    Hx:Has severe orthostatic hypotension on midodrine and Florinef, usually sees his doctor every month or two ( not every other day)  for [facility] infusion [facility] on 8-30 and had persistent low bp at 75 even after fluids and admitted-got di[facility]  He has ongoing chest pain , pacer, and is amiodarone as well  He remains hypotensive,   Echo 50-55% here   Initial EKG showed P waves with atrial sensing and V pacing on 8-29  Then EKG 8-30 with atrial flutter with LBBB and V paced beats  EKG 9-3with afib at rate 110  Cardiac meds at home on amiod 200mg day, ASA 81 mg , Florineft 0.2 day, midodrine 10 mg TID, or tremor is on primidone 250 mg bid   UA neg on admit, labs unremarkable  CXR clear  Social history notable for former smoker while young ,not for many decades,  Family history notable for early CAD in biologic family non specific, pt is adopted    Today-says he is dizzy on standing  bp has been stable  No chest pain or shortness of breath  No edema    Patient Vitals for the past 12 hrs:   Temp Pulse Resp BP SpO2   09/04/17 0753 97.5 °F (36.4 °C) 97 18 120/72 100 %   09/04/17 0331 98 °F (36.7 °C) 82 16 108/61 100 %   09/03/17 2320 97.8 °F (36.6 °C) 85 17 142/73 100 %        Discussion/Plans/Recs:    POTS  -severe but likely managable, no obvous worsening factor, no infection, change in environment, goal to return pt to baseline   Continue current doses of midodrine and Florinef, suggest more of a bolus when low rather than constant infusion  --agree restart Flomax  --seems at maximal hospital benefit,  Suggest give 1liter fluids today over 4 hours and have pt add Florinef 0.2 mg in evening to the 0.4 mg he takes in am ( he thinks he takes two 0.2 mg tabs each am) and of course continue midodrine 10 mg TID  ---fu with Dr Arnulfo Kaye and Ronnie Jolley at [facility] for years on amiodarone  Has paroxysmal afib flutter susepcted, so rhythm has not changed from baseline but rate was higher at 110 so no longer V pacing , seems controlled now     Chest pain and clavicle pain, seems atypical for ischemia, negative  enzymes, history of CAD , dont feel he needs to stay for a stress test        CAD s/p LAD stent-cp as above only on aspirin  ----Not on statin, Pt factors     CHF, no BB,ACE due to low bp  --EF now normal on latest echo  ---CRT AICD BiV [employer]tr[employer]  --no edema or rales     Systolic murmur--unclear , says is chronic, likely sclerosis, contemporary echo read as not valve disease     Hx of GI bleed  Lab Results   Component Value Date/Time    HGB 11.4 09/04/2017 03:14 AM      Presume he will be dc later today  I have ordered the NS 1000cc and added the PM Florinef  No future appointments.       Cardiac Studies/Hx:  CAD with LAD stent 2012/ Dr Christina Vidal [facility]  Cardiac arrest Sept 2012  AICD with CRT 9-24-12 Dr Lexi Burgess [facility], had ?pericardial effusion , transfer to [facility]  CHF systolic  --EF 46% 2/6009 Dr Jessi Galaviz [facility]  CATH 5/2015- RHC/LHC  non occlusive CAD with patent LAD stent and normal right and left sided pressures Dr Jessi Galaviz [facility]-has BiV AICD, not taking BB or ACE due to severe postural hypotension syndrome  PAF on amiodarone   POTS fu with Dr Brandon Keita Tremor  Hypothyroid  Blood stools 2016 and abdominal pain         Past Medical History:   Diagnosis Date    AMI (acute myocardial infarction) ([address]     Atrial fibrillation ([address]     Colonic polyp     Diverticulosis     Heart failure (HCC)     Heart murmur     Hemorrhoid     Hodgkin lymphoma ([address]     Orthostatic hypotension     Rectal bleeding     Stroke (HCC)     Tremor     UTI (urinary tract infection)       ROS-pertinents negative except as above  The pertinent portions of the medical history,physician and nursing notes, meds,vitals , labs and Ins/Outs,are reviewed in the electronic record.     Results for orders placed or performed during the hospital encounter of 08/30/17   EKG, 12 LEAD, INITIAL   Result Value Ref Range    Ventricular Rate 110 BPM    Atrial Rate 88 BPM    QRS Duration 124 ms    Q-T Interval 318 ms    QTC Calculation (Bezet) 430 ms    Calculated R Axis 81 degrees    Calculated T Axis -153 degrees    Diagnosis       Atrial flutter with rapid ventricular response  Left bundle branch block  ST & T wave abnormality, consider inferior ischemia or digitalis effect  When compared with ECG of 30-AUG-2017 19:44,  No significant change was found    Confirmed by Flower Avelar MD. ([id number]) on 9/3/2017 12:52:42 PM        Vitals:    09/03/17 2021 09/03/17 2320 09/04/17 0331 09/04/17 0753   BP: 155/81 142/73 108/61 120/72   BP 1 Location: Left arm Left arm Left arm    BP Patient Position: At rest At rest At rest    Pulse: 80 85 82 97   Resp: 16 17 16 18   Temp: 97.5 °F (36.4 °C) 97.8 °F (36.6 °C) 98 °F (36.7 °C) 97.5 °F (36.4 °C)   SpO2: 100% 100% 100% 100%   Weight:   148 lb 2.4 oz (67.2 kg)    Height:           Objective:    Physical Exam:   Patient Vitals for the past 12 hrs:   Temp Pulse Resp BP SpO2   09/04/17 0753 97.5 °F (36.4 °C) 97 18 120/72 100 %   09/04/17 0331 98 °F (36.7 °C) 82 16 108/61 100 %   09/03/17 2320 97.8 °F (36.6 °C) 85 17 142/73 100 %      General:  alert, cooperative, no distress, appears stated age   ENT, Neck:  no jvd   Chest Wall: inspection normal - no chest wall deformities or tenderness, respiratory effort normal   Lung: clear to auscultation bilaterally   Heart:  normal rate, regular rhythm, normal S1, S2, no murmurs, rubs, clicks or gallops   Abdomen: nondistended   Extremities: extremities normal, atraumatic, no cyanosis or edema     Last 24hr Input/Output:    Intake/Output Summary (Last 24 hours) at 09/04/17 0948  Last data filed at 09/04/17 0930   Gross per 24 hour   Intake             3560 ml   Output             2475 ml   Net             1085 ml        Data Review:   Recent Results (from the past 24 hour(s))   TROPONIN I    Collection Time: 09/03/17 10:38 AM   Result Value Ref Range    Troponin-I, Qt. <0.04 <0.05 ng/mL   TROPONIN I    Collection Time: 09/03/17  5:41 PM   Result Value Ref Range    Troponin-I, Qt. <0.04 <0.05 ng/mL   CBC WITH AUTOMATED DIFF    Collection Time: 09/04/17  3:14 AM   Result Value Ref Range    WBC 5.5 4.1 - 11.1 K/uL    RBC 3.71 (L) 4.10 - 5.70 M/uL    HGB 11.4 (L) 12.1 - 17.0 g/dL    HCT 34.1 (L) 36.6 - 50.3 %    MCV 91.9 80.0 - 99.0 FL    MCH 30.7 26.0 - 34.0 PG    MCHC 33.4 30.0 - 36.5 g/dL    RDW 14.4 11.5 - 14.5 %    PLATELET 149 863 - 759 K/uL    NEUTROPHILS 56 32 - 75 %    LYMPHOCYTES 25 12 - 49 %    MONOCYTES 13 5 - 13 %    EOSINOPHILS 6 0 - 7 %    BASOPHILS 0 0 - 1 %    ABS. NEUTROPHILS 3.1 1.8 - 8.0 K/UL    ABS. LYMPHOCYTES 1.4 0.8 - 3.5 K/UL    ABS. MONOCYTES 0.7 0.0 - 1.0 K/UL    ABS. EOSINOPHILS 0.3 0.0 - 0.4 K/UL    ABS. BASOPHILS 0.0 0.0 - 0.1 K/UL   METABOLIC PANEL, COMPREHENSIVE    Collection Time: 09/04/17  3:14 AM   Result Value Ref Range    Sodium 142 136 - 145 mmol/L    Potassium 3.4 (L) 3.5 - 5.1 mmol/L    Chloride 108 97 - 108 mmol/L    CO2 27 21 - 32 mmol/L    Anion gap 7 5 - 15 mmol/L    Glucose 101 (H) 65 - 100 mg/dL    BUN 13 6 - 20 MG/DL    Creatinine 0.98 0.70 - 1.30 MG/DL    BUN/Creatinine ratio 13 12 - 20      GFR est AA >60 >60 ml/min/1.73m2    GFR est non-AA >60 >60 ml/min/1.73m2    Calcium 8.2 (L) 8.5 - 10.1 MG/DL    Bilirubin, total 0.2 0.2 - 1.0 MG/DL    ALT (SGPT) 13 12 - 78 U/L    AST (SGOT) 14 (L) 15 - 37 U/L    Alk.  phosphatase 78 45 - 117 U/L    Protein, total 6.6 6.4 - 8.2 g/dL    Albumin 3.1 (L) 3.5 - 5.0 g/dL    Globulin 3.5 2.0 - 4.0 g/dL    A-G Ratio 0.9 (L) 1.1 - 2.2        Danni Barrow MD 9/4/2017

## 2017-09-04 NOTE — PROGRESS NOTES
Problem: Falls - Risk of  Goal: *Absence of Falls  Document Basil Fall Risk and appropriate interventions in the flowsheet. Outcome: Progressing Towards Goal  Fall Risk Interventions:  Mobility Interventions: Patient to call before getting OOB           Medication Interventions: Patient to call before getting OOB     Elimination Interventions: Call light in reach     History of Falls Interventions: Door open when patient unattended     Call bell is with in reach, side rails are up x 3, bed wheels are locked and bed is in its lowest position. Hourly rounds performed for patient safety.

## 2017-09-04 NOTE — PROGRESS NOTES
Problem: Pressure Injury - Risk of  Goal: *Prevention of pressure ulcer  Outcome: Progressing Towards Goal  Will continue to monitor skin integrity during shift and encourage patient to turn every two hours.

## 2017-09-04 NOTE — PROGRESS NOTES
Hospitalist Progress Note         Dr Cait Dorsey  Please call  and page for questions. Call physician on-call through the  7pm-7am    Daily Progress Note: 9/4/2017    Primary care Ester Everett MD    Date of admission: 8/30/2017  7:25 PM    Admission summery and hospital course:  67 yom with pmh of CAD, MI, afib, s/p PPM, PTCA, stent,heart failure, stroke, UTI, orthostatic hypotension, Hodgkin lymphoma s/p chemo, diverticulitis, hemorrhoids, heart murmur, GI bleed, tremors who present from Dr Keri Monae [facility] nephrologist) for hypotension. BP dropped to 75/40 in office so he was sent to ED for further eval. Patient has h/o orthostatic hypotension for >4 yrs and has had extensive w/u with cardiology, neurology, and nephrology at [facility]. Subjective:   F/u hypotension  Patient said he is still dizzy even while sitting and lying down    No more chest pain       Assessment/Plan:   Chest pain:  -now resolved  -seems atypical for ischemia but with a history of CAD  -Appreciate Cardiology, ? lexiscan  -Awaiting V/q    Orthostatic Hypotension;   -Continue home Midodrine and Florinef has been increased to 0.4mg  -Patient said he used to get 2 liters of IV NS every other day at his PCP. -We will do NS at the rate of 100/hour monitor closely.  -Pulmonary edema on CXR on admission.   -Patient refused for thigh high RODRIGO hose, patient does not want to use it. Patient said he does not tolerate sodium tab.    -echo ef 55-60% no wall abnormalities  -Still dizzy, plans per cardiology but not sure if the patient would ever be symptom free     Hypothyroidism   -tsh wnl   -continue home levothyroxine      Difficulty with ambulating   -Continue PT/OT as he tolerates.       Resting tremor  -resume home meds      Fluid/Electrolytes/Nutirotion:   Will replace potassium and monitor.    Patient said he does not tolerate sodium salt.       Paroxysmal a-fib:   -On Amiodarone at home  -Appreciate Cardiology    CHF, chronic ?diastolic  -Not on BB/ACEi sec to low BP  -s/p ICD, to be interrogated tomorrow    Cardiac diet      See orders for other plans. VTE prophylaxis: Lovenox and pepcid. Code status: Full    Plan: Follow Cardiology, discharge soon    Discussed plan of care with Patient/Family and Nurse. Pre-admission lived at home. Discharge planning: pending. Review of Systems:     Review of Systems:  Symptom  Y/N  Comments   Symptom  Y/N  Comments    Fever/Chills   n   Chest Pain  y Getting better now. Poor Appetite  n    Edema  n     Cough  n   Abdominal Pain  n     Sputum  n   Joint Pain      SOB/SHELL  y   Pruritis/Rash      Nausea/vomit  n   Tolerating PT/OT      Diarrhea     Tolerating Diet  y    Constipation     Other      Could not obtain due to:         Objective:   Physical Exam:     Visit Vitals    /72    Pulse 97    Temp 97.5 °F (36.4 °C)    Resp 18    Ht 6' 2\" (1.88 m)    Wt 67.2 kg (148 lb 2.4 oz)    SpO2 100%    BMI 19.02 kg/m2      O2 Device: Room air    Temp (24hrs), Av.8 °F (36.6 °C), Min:97.5 °F (36.4 °C), Max:98.1 °F (36.7 °C)    701 - 1900  In: -   Out: 300 [Urine:300]   1901 -  07  In: 3080 [P.O.:1200; I.V.:1880]  Out: 8960 [Urine:3725]      General:  Alert, cooperative, no distress, appears stated age. Lungs:   Clear to auscultation bilaterally. Chest wall:  Tenderness a the lower sternum, no deformity. Pacemaker at the left upper chest without tenderness and or discharge. Heart:  Irregular rate and rhythm, S1, S2 normal.    Abdomen:   Soft, non-tender. Bowel sounds normal.    Extremities: Extremities normal, atraumatic, no cyanosis or edema. Pulses: 2+ and symmetric all extremities. Skin: Skin color, texture, turgor normal. No rashes or lesions   Neurologic: CNII-XII intact.       Data Review:       Recent Days:  Recent Labs      17   0314  17   0404   WBC  5.5  5.4   HGB  11.4*  11.5* HCT  34.1*  34.8*   PLT  172  172     Recent Labs      09/04/17   0314  09/03/17   0404   NA  142  142   K  3.4*  3.2*   CL  108  107   CO2  27  25   GLU  101*  101*   BUN  13  14   CREA  0.98  0.99   CA  8.2*  7.9*   MG   --   1.8   PHOS   --   2.6   ALB  3.1*  2.9*   SGOT  14*  11*   ALT  13  15     No results for input(s): PH, PCO2, PO2, HCO3, FIO2 in the last 72 hours. 24 Hour Results:  Recent Results (from the past 24 hour(s))   TROPONIN I    Collection Time: 09/03/17 10:38 AM   Result Value Ref Range    Troponin-I, Qt. <0.04 <0.05 ng/mL   TROPONIN I    Collection Time: 09/03/17  5:41 PM   Result Value Ref Range    Troponin-I, Qt. <0.04 <0.05 ng/mL   CBC WITH AUTOMATED DIFF    Collection Time: 09/04/17  3:14 AM   Result Value Ref Range    WBC 5.5 4.1 - 11.1 K/uL    RBC 3.71 (L) 4.10 - 5.70 M/uL    HGB 11.4 (L) 12.1 - 17.0 g/dL    HCT 34.1 (L) 36.6 - 50.3 %    MCV 91.9 80.0 - 99.0 FL    MCH 30.7 26.0 - 34.0 PG    MCHC 33.4 30.0 - 36.5 g/dL    RDW 14.4 11.5 - 14.5 %    PLATELET 459 854 - 844 K/uL    NEUTROPHILS 56 32 - 75 %    LYMPHOCYTES 25 12 - 49 %    MONOCYTES 13 5 - 13 %    EOSINOPHILS 6 0 - 7 %    BASOPHILS 0 0 - 1 %    ABS. NEUTROPHILS 3.1 1.8 - 8.0 K/UL    ABS. LYMPHOCYTES 1.4 0.8 - 3.5 K/UL    ABS. MONOCYTES 0.7 0.0 - 1.0 K/UL    ABS. EOSINOPHILS 0.3 0.0 - 0.4 K/UL    ABS. BASOPHILS 0.0 0.0 - 0.1 K/UL   METABOLIC PANEL, COMPREHENSIVE    Collection Time: 09/04/17  3:14 AM   Result Value Ref Range    Sodium 142 136 - 145 mmol/L    Potassium 3.4 (L) 3.5 - 5.1 mmol/L    Chloride 108 97 - 108 mmol/L    CO2 27 21 - 32 mmol/L    Anion gap 7 5 - 15 mmol/L    Glucose 101 (H) 65 - 100 mg/dL    BUN 13 6 - 20 MG/DL    Creatinine 0.98 0.70 - 1.30 MG/DL    BUN/Creatinine ratio 13 12 - 20      GFR est AA >60 >60 ml/min/1.73m2    GFR est non-AA >60 >60 ml/min/1.73m2    Calcium 8.2 (L) 8.5 - 10.1 MG/DL    Bilirubin, total 0.2 0.2 - 1.0 MG/DL    ALT (SGPT) 13 12 - 78 U/L    AST (SGOT) 14 (L) 15 - 37 U/L    Alk. phosphatase 78 45 - 117 U/L    Protein, total 6.6 6.4 - 8.2 g/dL    Albumin 3.1 (L) 3.5 - 5.0 g/dL    Globulin 3.5 2.0 - 4.0 g/dL    A-G Ratio 0.9 (L) 1.1 - 2.2         Problem List:  Problem List as of 9/4/2017  Date Reviewed: 8/31/2017          Codes Class Noted - Resolved    Acute hyperkalemia ICD-10-CM: E87.5  ICD-9-CM: 276.7  8/31/2017 - Present        Pulmonary edema ICD-10-CM: J81.1  ICD-9-CM: 443  8/31/2017 - Present        * (Principal)Orthostatic hypotension ICD-10-CM: I95.1  ICD-9-CM: 458.0  8/31/2017 - Present        Gait instability ICD-10-CM: R26.81  ICD-9-CM: 781.2  8/31/2017 - Present              Medications reviewed  Current Facility-Administered Medications   Medication Dose Route Frequency    enoxaparin (LOVENOX) injection 40 mg  40 mg SubCUTAneous Q24H    acetaminophen (TYLENOL) tablet 650 mg  650 mg Oral Q4H PRN    famotidine (PEPCID) tablet 20 mg  20 mg Oral BID    tamsulosin (FLOMAX) capsule 0.4 mg  0.4 mg Oral QHS    ondansetron (ZOFRAN) injection 4 mg  4 mg IntraVENous Q6H PRN    fludrocortisone (FLORINEF) tablet 400 mcg  400 mcg Oral DAILY    amiodarone (CORDARONE) tablet 100 mg  100 mg Oral DAILY    sodium chloride (NS) flush 5-10 mL  5-10 mL IntraVENous Q8H    sodium chloride (NS) flush 5-10 mL  5-10 mL IntraVENous PRN    aspirin delayed-release tablet 81 mg  81 mg Oral DAILY    levothyroxine (SYNTHROID) tablet 150 mcg  150 mcg Oral ACB    cyanocobalamin (VITAMIN B12) tablet 2,000 mcg  2,000 mcg Oral DAILY    primidone (MYSOLINE) tablet 250 mg  250 mg Oral BID    hydrocortisone (CORTAID) 1 % cream   Topical TID PRN    midodrine (PROAMITINE) tablet 10 mg  10 mg Oral TID WITH MEALS       Care Plan discussed with: Patient/Family and Nurse    Total time spent with patient: 30 minutes.     Alisa Whitt MD

## 2017-09-04 NOTE — PROGRESS NOTES
Problem: Hypotension  Goal: *Blood pressure within specified parameters  Outcome: Progressing Towards Goal  Blood pressure within normal limits throughout shift. Problem: General Medical Care Plan  Goal: *Optimal pain control at patients stated goal  Outcome: Progressing Towards Goal  Patient with no complaints of pain throughout shift. Goal: *Progressive mobility and function (eg: ADLs)  Outcome: Progressing Towards Goal  Patient up to chair today.

## 2017-09-05 LAB
ATRIAL RATE: 96 BPM
CALCULATED R AXIS, ECG10: 97 DEGREES
CALCULATED T AXIS, ECG11: 33 DEGREES
DIAGNOSIS, 93000: NORMAL
IGA SERPL-MCNC: 269 MG/DL (ref 61–437)
IGG SERPL-MCNC: 1007 MG/DL (ref 700–1600)
IGM SERPL-MCNC: 55 MG/DL (ref 15–143)
INTERPRETATION UR IFE-IMP: NORMAL
MAGNESIUM SERPL-MCNC: 1.9 MG/DL (ref 1.6–2.4)
PHOSPHATE SERPL-MCNC: 2.7 MG/DL (ref 2.6–4.7)
PROT PATTERN SERPL IFE-IMP: NORMAL
Q-T INTERVAL, ECG07: 392 MS
QRS DURATION, ECG06: 124 MS
QTC CALCULATION (BEZET), ECG08: 487 MS
VENTRICULAR RATE, ECG03: 93 BPM

## 2017-09-05 PROCEDURE — 36415 COLL VENOUS BLD VENIPUNCTURE: CPT | Performed by: INTERNAL MEDICINE

## 2017-09-05 PROCEDURE — 65620000000 HC RM CCU GENERAL

## 2017-09-05 PROCEDURE — 74011250637 HC RX REV CODE- 250/637: Performed by: FAMILY MEDICINE

## 2017-09-05 PROCEDURE — 74011250637 HC RX REV CODE- 250/637: Performed by: SPECIALIST

## 2017-09-05 PROCEDURE — 83735 ASSAY OF MAGNESIUM: CPT | Performed by: INTERNAL MEDICINE

## 2017-09-05 PROCEDURE — 93005 ELECTROCARDIOGRAM TRACING: CPT

## 2017-09-05 PROCEDURE — 84100 ASSAY OF PHOSPHORUS: CPT | Performed by: INTERNAL MEDICINE

## 2017-09-05 RX ORDER — MIDODRINE HYDROCHLORIDE 5 MG/1
15 TABLET ORAL
Status: DISCONTINUED | OUTPATIENT
Start: 2017-09-06 | End: 2017-09-06 | Stop reason: HOSPADM

## 2017-09-05 RX ORDER — AMIODARONE HYDROCHLORIDE 200 MG/1
200 TABLET ORAL 2 TIMES DAILY
Status: DISCONTINUED | OUTPATIENT
Start: 2017-09-06 | End: 2017-09-06 | Stop reason: HOSPADM

## 2017-09-05 RX ADMIN — MIDODRINE HYDROCHLORIDE 10 MG: 5 TABLET ORAL at 18:37

## 2017-09-05 RX ADMIN — MIDODRINE HYDROCHLORIDE 10 MG: 5 TABLET ORAL at 10:24

## 2017-09-05 RX ADMIN — FLUDROCORTISONE ACETATE 400 MCG: 0.1 TABLET ORAL at 10:24

## 2017-09-05 RX ADMIN — LEVOTHYROXINE SODIUM 150 MCG: 150 TABLET ORAL at 08:39

## 2017-09-05 RX ADMIN — PRIMIDONE 250 MG: 250 TABLET ORAL at 10:28

## 2017-09-05 RX ADMIN — PRIMIDONE 250 MG: 250 TABLET ORAL at 18:35

## 2017-09-05 RX ADMIN — ASPIRIN 81 MG: 81 TABLET, COATED ORAL at 10:24

## 2017-09-05 RX ADMIN — MIDODRINE HYDROCHLORIDE 10 MG: 5 TABLET ORAL at 15:10

## 2017-09-05 RX ADMIN — ACETAMINOPHEN 650 MG: 325 TABLET, FILM COATED ORAL at 01:39

## 2017-09-05 RX ADMIN — AMIODARONE HYDROCHLORIDE 100 MG: 200 TABLET ORAL at 10:24

## 2017-09-05 RX ADMIN — FAMOTIDINE 20 MG: 20 TABLET ORAL at 18:35

## 2017-09-05 RX ADMIN — TAMSULOSIN HYDROCHLORIDE 0.4 MG: 0.4 CAPSULE ORAL at 20:56

## 2017-09-05 RX ADMIN — FAMOTIDINE 20 MG: 20 TABLET ORAL at 10:24

## 2017-09-05 RX ADMIN — Medication 10 ML: at 20:57

## 2017-09-05 RX ADMIN — Medication 2000 MCG: at 10:24

## 2017-09-05 RX ADMIN — FLUDROCORTISONE ACETATE 200 MCG: 0.1 TABLET ORAL at 18:35

## 2017-09-05 NOTE — PROGRESS NOTES
Cardiology returned page;    Reshma gave telephone orders with read back to place patient on CCU; contact [employer] Rep for pacer

## 2017-09-05 NOTE — PROGRESS NOTES
Occupational Therapy Note  9/5/2017    Chart reviewed. Noted rapid response early this AM; pt with v-tach in the 140s and associated chest pain. Cardiology consulted and pt transferred to CCU. Will hold and follow-up later as able and appropriate.     Jami Dillard, OTR/L

## 2017-09-05 NOTE — PROGRESS NOTES
Bedside and Verbal shift change report given to Tamiko (oncoming nurse) by Meenu Mccarty (offgoing nurse). Report included the following information SBAR, Kardex, Intake/Output, MAR, Accordion, Recent Results and Cardiac Rhythm -A fib.     2000 - Assumed care. 2100 - CHG bath given. 0000 - Pt resting comfortably, VSS, no c/o.  0300 - Assisted pt to void, AM labs drawn. Bedside and Verbal shift change report given to Alivia Szymanski (oncoming nurse) by Balwinder (offgoing nurse). Report included the following information SBAR, Kardex, Intake/Output, MAR, Accordion, Recent Results and Cardiac Rhythm -A fib/paced.

## 2017-09-05 NOTE — PROGRESS NOTES
20:00 Bedside shift change report given to Hever Crystal RN (oncoming nurse) by Faith Aguirre RN (offgoing nurse). Report included the following information SBAR, Kardex and Recent Results.

## 2017-09-05 NOTE — PROGRESS NOTES
0700  TRANSFER - IN REPORT:    Verbal report received from Johanna(name) on Adalid Schneider  being received from [facility](unit) for change in patient condition(Rapid A-fib)      Report consisted of patients Situation, Background, Assessment and   Recommendations(SBAR). Information from the following report(s) SBAR, Kardex, Intake/Output, MAR, Recent Results and Cardiac Rhythm Rapid Afib was reviewed with the receiving nurse. Opportunity for questions and clarification was provided. Assessment completed upon patients arrival to unit and care assumed. Pt attached to monitor and VSS . Pt denies c/o pain. 0730  Bedside shift change report given to Peter James (oncoming nurse) by Anand Patel (offgoing nurse). Report included the following information SBAR, Kardex, Intake/Output, MAR, Recent Results and Cardiac Rhythm A FIB/ ? Paced.

## 2017-09-05 NOTE — PROGRESS NOTES
Patient in [facility] 140s with chest pain; Rapid Response called; cardiology consulted; A-fib with BBB

## 2017-09-05 NOTE — WOUND CARE
WOCN Note:     New consult placed by RN for left hip and sacrum. Chart shows:  Admitted for orthostatic hypotension; history of CAD, MI, pacer, CVA, hodgkin lymphoma with chemo, diverticulosis, falls    Assessment:   Patient is A&O x 4, continent and mobile - repositioned independently. Patient reports no pain. Bilateral heel, buttocks, and sacral skin intact and without erythema. Some gluteal cleft dyschromia. Left hip scar tissue with dyschromia. Tiny bites ?insect? Over trunk and legs - less so on arms and none noted on face or head. Appear drying. He reports they no longer itch. Allow bites to continue drying. Aloe Vesta to sacrum and buttocks. Discussed above plan with patient & RN, Sona Yuen. Transition of Care: will sign off. Please reconsult if needed.      Dulce Vaughn, OLGAN, RN, [employer]  Certified Wound, Ostomy, Continence Nurse  office [phone]  pager [phone] or call  to page

## 2017-09-05 NOTE — PROGRESS NOTES
Physical Therapy  9.5.17    Chart reviewed. Noted rapid-response early this AM. Patient with VTach in the 140s with associated chest pain. Cardiology consulted, and patient transferred to CCU. Will follow-up as appropriate. Thank you!   Ayush Dykes, PT, DPT

## 2017-09-05 NOTE — PROGRESS NOTES
Patient transferred to CCU s/p rapid response. Care management will continue to follow.    Disha Lion

## 2017-09-05 NOTE — PROGRESS NOTES
Hospitalist Progress Note         Hilary Sargent MD  Please call  and page for questions. Call physician on-call through the  7pm-7am    Daily Progress Note: 9/5/2017    Primary care Irina Weir MD    Date of admission: 8/30/2017  7:25 PM    Admission summery and hospital course:  67 yom with pmh of CAD, MI, afib, s/p PPM, PTCA, stent,heart failure, stroke, UTI, orthostatic hypotension, Hodgkin lymphoma s/p chemo, diverticulitis, hemorrhoids, heart murmur, GI bleed, tremors who present from Dr Helena Regan [facility] nephrologist) for hypotension. BP dropped to 75/40 in office so he was sent to ED for further eval. Patient has h/o orthostatic hypotension for >4 yrs and has had extensive w/u with cardiology, neurology, and nephrology at [facility]. Subjective:   Pt seen and examined  Still feels dizzy on standing, bp better         Assessment/Plan:   Chest pain:  -now resolved  -seems atypical for ischemia but with a history of CAD  -Appreciate Cardiology,   -no need for V/Q    Postural Orthostatic Tachycardia syndrome;   -Continue home Midodrine and Florinef has been increased to 0.4mg in AM and 0.2mg PM   -Patient said he used to get 2 liters of IV NS every other day at his PCP. -We will do NS at the rate of 100/hour monitor closely.  -Pulmonary edema on CXR on admission.   -Patient refused for thigh high RODRIGO hose, patient does not want to use it. Patient said he does not tolerate sodium tab.    -echo ef 55-60% no wall abnormalities  -Still dizzy, plans per cardiology but not sure if the patient would ever be symptom free     Hypothyroidism   -tsh wnl   -continue home levothyroxine      Difficulty with ambulating   -Continue PT/OT as he tolerates.       Resting tremor  -resume home meds      Fluid/Electrolytes/Nutirotion:   Will replace potassium and monitor.    Patient said he does not tolerate sodium salt.       Paroxysmal a-fib with ICD  -On Amiodarone at home  -Appreciate Cardiology  - overnight pacing out with AFib and LBBB  - Interrogate pacer today    CHF, chronic ?diastolic  -Not on BB/ACEi sec to low BP  -s/p ICD, to be interrogated tomorrow    Cardiac diet      See orders for other plans. VTE prophylaxis: Lovenox and pepcid. Code status: Full    Plan: Follow Cardiology, discharge soon    Discussed plan of care with Patient/Family and Nurse. Pre-admission lived at home. Discharge planning: planning discharge home after interrogation if stable       Review of Systems:     Review of Systems:  Symptom  Y/N  Comments   Symptom  Y/N  Comments    Fever/Chills   n   Chest Pain  y Getting better now. Poor Appetite  n    Edema  n     Cough  n   Abdominal Pain  n     Sputum  n   Joint Pain      SOB/SHELL  y   Pruritis/Rash      Nausea/vomit  n   Tolerating PT/OT      Diarrhea     Tolerating Diet  y    Constipation     Other      Could not obtain due to:         Objective:   Physical Exam:     Visit Vitals    /77    Pulse 86    Temp 98 °F (36.7 °C)    Resp 16    Ht 6' 2\" (1.88 m)    Wt 65.4 kg (144 lb 1.6 oz)    SpO2 100%    BMI 18.5 kg/m2      O2 Device: Room air    Temp (24hrs), Av.8 °F (36.6 °C), Min:97.4 °F (36.3 °C), Max:98.1 °F (36.7 °C)         1901 -  0700  In: 2200 [P.O.:1200; I.V.:1000]  Out: 3100 [Urine:3100]      General:  Alert, cooperative, no distress, appears stated age. Lungs:   Clear to auscultation bilaterally. Chest wall:  Tenderness a the lower sternum, no deformity. Pacemaker at the left upper chest without tenderness and or discharge. Heart:  RRR    Abdomen:   Soft, non-tender. Bowel sounds normal.    Extremities: Extremities normal, atraumatic, no cyanosis or edema. Pulses: 2+ and symmetric all extremities. Skin: Skin color, texture, turgor normal. No rashes or lesions   Neurologic: CNII-XII intact.       Data Review:       Recent Days:  Recent Labs      17   0314  17   0404   WBC  5.5  5.4 HGB  11.4*  11.5*   HCT  34.1*  34.8*   PLT  172  172     Recent Labs      09/05/17   0543  09/04/17   0314  09/03/17   0404   NA   --   142  142   K   --   3.4*  3.2*   CL   --   108  107   CO2   --   27  25   GLU   --   101*  101*   BUN   --   13  14   CREA   --   0.98  0.99   CA   --   8.2*  7.9*   MG  1.9   --   1.8   PHOS  2.7   --   2.6   ALB   --   3.1*  2.9*   SGOT   --   14*  11*   ALT   --   13  15     No results for input(s): PH, PCO2, PO2, HCO3, FIO2 in the last 72 hours.     24 Hour Results:  Recent Results (from the past 24 hour(s))   EKG, 12 LEAD, INITIAL    Collection Time: 09/05/17  5:35 AM   Result Value Ref Range    Ventricular Rate 93 BPM    Atrial Rate 96 BPM    QRS Duration 124 ms    Q-T Interval 392 ms    QTC Calculation (Bezet) 487 ms    Calculated R Axis 97 degrees    Calculated T Axis 33 degrees    Diagnosis       Atrial fibrillation  Rightward axis  Left ventricular hypertrophy with QRS widening  ST & T wave abnormality, consider inferolateral ischemia or digitalis effect  When compared with ECG of 03-SEP-2017 03:54,  ST now depressed in Lateral leads    Confirmed by Lizz Pandey M.D., [facility] ([id number]) on 9/5/2017 8:30:44 AM     PHOSPHORUS    Collection Time: 09/05/17  5:43 AM   Result Value Ref Range    Phosphorus 2.7 2.6 - 4.7 MG/DL   MAGNESIUM    Collection Time: 09/05/17  5:43 AM   Result Value Ref Range    Magnesium 1.9 1.6 - 2.4 mg/dL       Problem List:  Problem List as of 9/5/2017  Date Reviewed: 8/31/2017          Codes Class Noted - Resolved    Acute hyperkalemia ICD-10-CM: E87.5  ICD-9-CM: 276.7  8/31/2017 - Present        Pulmonary edema ICD-10-CM: J81.1  ICD-9-CM: 582  8/31/2017 - Present        * (Principal)Orthostatic hypotension ICD-10-CM: I95.1  ICD-9-CM: 458.0  8/31/2017 - Present        Gait instability ICD-10-CM: R26.81  ICD-9-CM: 781.2  8/31/2017 - Present              Medications reviewed  Current Facility-Administered Medications   Medication Dose Route Frequency    fludrocortisone (FLORINEF) tablet 200 mcg  200 mcg Oral QPM    enoxaparin (LOVENOX) injection 40 mg  40 mg SubCUTAneous Q24H    acetaminophen (TYLENOL) tablet 650 mg  650 mg Oral Q4H PRN    famotidine (PEPCID) tablet 20 mg  20 mg Oral BID    tamsulosin (FLOMAX) capsule 0.4 mg  0.4 mg Oral QHS    ondansetron (ZOFRAN) injection 4 mg  4 mg IntraVENous Q6H PRN    fludrocortisone (FLORINEF) tablet 400 mcg  400 mcg Oral DAILY    amiodarone (CORDARONE) tablet 100 mg  100 mg Oral DAILY    sodium chloride (NS) flush 5-10 mL  5-10 mL IntraVENous Q8H    sodium chloride (NS) flush 5-10 mL  5-10 mL IntraVENous PRN    aspirin delayed-release tablet 81 mg  81 mg Oral DAILY    levothyroxine (SYNTHROID) tablet 150 mcg  150 mcg Oral ACB    cyanocobalamin (VITAMIN B12) tablet 2,000 mcg  2,000 mcg Oral DAILY    primidone (MYSOLINE) tablet 250 mg  250 mg Oral BID    hydrocortisone (CORTAID) 1 % cream   Topical TID PRN    midodrine (PROAMITINE) tablet 10 mg  10 mg Oral TID WITH MEALS       Care Plan discussed with: Patient/Family and Nurse    Total time spent with patient: 30 minutes.     Sandy Avery MD

## 2017-09-05 NOTE — PROGRESS NOTES
TRANSFER - OUT REPORT:    Verbal report given to Kimberly Casillas (name) on Pete Donald  being transferred to CCU(unit) for change in patient condition(A-fib BBB, tachycardia 140s, rapid response )       Report consisted of patients Situation, Background, Assessment and   Recommendations(SBAR). Information from the following report(s) SBAR, ED Summary, Procedure Summary, MAR, Recent Results and Cardiac Rhythm A-fib BBB was reviewed with the receiving nurse. Lines:   Peripheral IV 08/30/17 Left Hand (Active)   Site Assessment Clean, dry, & intact 9/5/2017  4:14 AM   Phlebitis Assessment 0 9/5/2017  4:14 AM   Infiltration Assessment 0 9/5/2017  4:14 AM   Dressing Status Clean, dry, & intact 9/5/2017  4:14 AM   Dressing Type Transparent;Tape 9/5/2017  4:14 AM   Hub Color/Line Status Pink;Capped 9/5/2017  4:14 AM   Action Taken Open ports on tubing capped 9/5/2017  4:14 AM   Alcohol Cap Used Yes 9/5/2017  4:14 AM        Opportunity for questions and clarification was provided.       Patient transported with:   Registered Nurse

## 2017-09-05 NOTE — PROGRESS NOTES
0800:  Report received from Ellen Mcnamara. Pt resting comfortably in bed at this time. [employer] rep consulted to interrogate pacemaker. Primary Nurse Pamella King RN and Carmela Jacques RN performed a dual skin assessment on this patient. Area of non-blanchable redness to sacrum and left hip. Lesions throughout entire body pt reports are \"Chiggers\". Will consult wound care. Nico score is 21    1600: Pt wife at bedside. 2000:  Bedside shift change report given to Ravinder (oncoming nurse) by Brian Perez (offgoing nurse). Report included the following information SBAR, Kardex, MAR and Recent Results. Problem: Falls - Risk of  Goal: *Absence of Falls  Document Basil Fall Risk and appropriate interventions in the flowsheet.    Outcome: Progressing Towards Goal  Fall Risk Interventions:  Mobility Interventions: Communicate number of staff needed for ambulation/transfer           Medication Interventions: Assess postural VS orthostatic hypotension     Elimination Interventions: Call light in reach     History of Falls Interventions: Door open when patient unattended

## 2017-09-05 NOTE — INTERDISCIPLINARY ROUNDS
CRITICAL CARE INTERDISCIPLINARY ROUNDS      Patient Information:    Name:   Mary Reddy    Age:   67 y.o. Admission Date:   8/30/2017    Readmit Risk Assessment Information:      Readmit Risk Tool Support Systems: Family member(s), Relationship with Primary Physician Group: Seen at least one time within the past 6 months      Day of Stay: 5    Expected Discharge Date:    tbd    Attending Provider:   Dennys Caceres MD      Consultant:  Cardiology    Primary Care Provider:   Michael Delarosa MD    Problem List:     Patient Active Problem List   Diagnosis Code    Acute hyperkalemia E87.5    Pulmonary edema J81.1    Orthostatic hypotension I95.1    Gait instability R26.81       Principal Problem:  Orthostatic hypotension    Procedure:   Pacer interrogation    During rounds the following quality care indicators and evidence based practices were addressed :     DVT Prophylaxis, Pressure Injury Prevention, Pain Management and D/C Instructions              Transfer Level of Care:  Progressing to Transfer    The patient will require the following interventions based on the Readmission Risk Assessment:  Care Management involvement for home health follow up for:  mobility      Discharge Management:  Home    Anticipated Discharge Date:  tbd      Interdisciplinary team rounds were held on 9/5/17 with the following team membersCare Management and the  patient. Plan of care discussed. See clinical pathway and/or care plan for interventions and desired outcomes.

## 2017-09-06 VITALS
OXYGEN SATURATION: 100 % | BODY MASS INDEX: 18.58 KG/M2 | HEART RATE: 77 BPM | DIASTOLIC BLOOD PRESSURE: 62 MMHG | WEIGHT: 144.8 LBS | HEIGHT: 74 IN | RESPIRATION RATE: 15 BRPM | SYSTOLIC BLOOD PRESSURE: 111 MMHG | TEMPERATURE: 97.6 F

## 2017-09-06 LAB
ANION GAP SERPL CALC-SCNC: 10 MMOL/L (ref 5–15)
BUN SERPL-MCNC: 17 MG/DL (ref 6–20)
BUN/CREAT SERPL: 14 (ref 12–20)
CALCIUM SERPL-MCNC: 8.2 MG/DL (ref 8.5–10.1)
CHLORIDE SERPL-SCNC: 106 MMOL/L (ref 97–108)
CO2 SERPL-SCNC: 26 MMOL/L (ref 21–32)
CREAT SERPL-MCNC: 1.22 MG/DL (ref 0.7–1.3)
GLUCOSE SERPL-MCNC: 94 MG/DL (ref 65–100)
POTASSIUM SERPL-SCNC: 3.3 MMOL/L (ref 3.5–5.1)
SODIUM SERPL-SCNC: 142 MMOL/L (ref 136–145)

## 2017-09-06 PROCEDURE — 36415 COLL VENOUS BLD VENIPUNCTURE: CPT | Performed by: HOSPITALIST

## 2017-09-06 PROCEDURE — 80048 BASIC METABOLIC PNL TOTAL CA: CPT | Performed by: HOSPITALIST

## 2017-09-06 PROCEDURE — 74011250637 HC RX REV CODE- 250/637: Performed by: FAMILY MEDICINE

## 2017-09-06 PROCEDURE — 74011250637 HC RX REV CODE- 250/637: Performed by: PHYSICIAN ASSISTANT

## 2017-09-06 PROCEDURE — 74011250637 HC RX REV CODE- 250/637: Performed by: SPECIALIST

## 2017-09-06 RX ORDER — AMIODARONE HYDROCHLORIDE 200 MG/1
200 TABLET ORAL 2 TIMES DAILY
Qty: 60 TAB | Refills: 1 | Status: SHIPPED | OUTPATIENT
Start: 2017-09-06

## 2017-09-06 RX ORDER — FLUDROCORTISONE ACETATE 0.1 MG/1
0.2 TABLET ORAL EVERY EVENING
Qty: 30 TAB | Refills: 1 | Status: SHIPPED | OUTPATIENT
Start: 2017-09-06

## 2017-09-06 RX ORDER — POTASSIUM CHLORIDE 750 MG/1
40 TABLET, FILM COATED, EXTENDED RELEASE ORAL
Status: DISCONTINUED | OUTPATIENT
Start: 2017-09-06 | End: 2017-09-06 | Stop reason: HOSPADM

## 2017-09-06 RX ORDER — MIDODRINE HYDROCHLORIDE 10 MG/1
15 TABLET ORAL 3 TIMES DAILY
Qty: 130 TAB | Refills: 1 | Status: SHIPPED | OUTPATIENT
Start: 2017-09-06

## 2017-09-06 RX ORDER — FLUDROCORTISONE ACETATE 0.1 MG/1
0.4 TABLET ORAL DAILY
Qty: 120 TAB | Refills: 1 | Status: SHIPPED | OUTPATIENT
Start: 2017-09-06

## 2017-09-06 RX ADMIN — Medication 10 ML: at 06:48

## 2017-09-06 RX ADMIN — AMIODARONE HYDROCHLORIDE 200 MG: 200 TABLET ORAL at 08:06

## 2017-09-06 RX ADMIN — FLUDROCORTISONE ACETATE 400 MCG: 0.1 TABLET ORAL at 08:06

## 2017-09-06 RX ADMIN — LEVOTHYROXINE SODIUM 150 MCG: 150 TABLET ORAL at 06:48

## 2017-09-06 RX ADMIN — MIDODRINE HYDROCHLORIDE 15 MG: 5 TABLET ORAL at 11:49

## 2017-09-06 RX ADMIN — MIDODRINE HYDROCHLORIDE 15 MG: 5 TABLET ORAL at 08:02

## 2017-09-06 RX ADMIN — ASPIRIN 81 MG: 81 TABLET, COATED ORAL at 08:01

## 2017-09-06 RX ADMIN — Medication 2000 MCG: at 08:04

## 2017-09-06 RX ADMIN — FAMOTIDINE 20 MG: 20 TABLET ORAL at 08:03

## 2017-09-06 RX ADMIN — POTASSIUM CHLORIDE 40 MEQ: 750 TABLET, FILM COATED, EXTENDED RELEASE ORAL at 08:58

## 2017-09-06 RX ADMIN — AMIODARONE HYDROCHLORIDE 200 MG: 200 TABLET ORAL at 00:33

## 2017-09-06 RX ADMIN — PRIMIDONE 250 MG: 250 TABLET ORAL at 08:57

## 2017-09-06 NOTE — PROGRESS NOTES
Problem: Falls - Risk of  Goal: *Absence of Falls  Document Basil Fall Risk and appropriate interventions in the flowsheet.    Outcome: Progressing Towards Goal  Fall Risk Interventions:  Mobility Interventions: Communicate number of staff needed for ambulation/transfer, Patient to call before getting OOB           Medication Interventions: Assess postural VS orthostatic hypotension, Evaluate medications/consider consulting pharmacy, Patient to call before getting OOB, Teach patient to arise slowly     Elimination Interventions: Call light in reach, Patient to call for help with toileting needs     History of Falls Interventions: Door open when patient unattended, Evaluate medications/consider consulting pharmacy

## 2017-09-06 NOTE — DISCHARGE INSTRUCTIONS
Please bring this form with you to show your primary care provider at your follow-up appointment. Primary care provider:  Dr. Mason Sepulveda MD    Discharging provider:  Lilian Gilliland MD    You have been admitted to the hospital with the following diagnoses:  · Acute hyperkalemia  · Inability to walk  · Hypotension  · Pulmonary edema  · Orthostatic hypotension    FOLLOW-UP CARE RECOMMENDATIONS:    APPOINTMENTS:  Follow-up Information     Follow up With Details Comments Contact Dianna Garcia MD In 2 weeks Discharge follow up  [address]      Mirna Jimenez MD In 2 weeks discharge follow up  [address] [place] [address]  [phone]              FOLLOW-UP TESTS recommended: none    PENDING TEST RESULTS:  At the time of your discharge the following test results are still pending: none  Please make sure you review these results with your outpatient follow-up provider(s). SYMPTOMS to watch for: chest pain, shortness of breath, fever, chills, nausea, vomiting, diarrhea, change in mentation, falling, weakness, bleeding. DIET/what to eat:  Regular Diet    ACTIVITY:  Activity as tolerated with Outpatient PT/OT    WOUND CARE: none    EQUIPMENT needed:  none      What to do if new or unexpected symptoms occur? If you experience any of the above symptoms (or should other concerns or questions arise after discharge) please call your primary care physician. Return to the emergency room if you cannot get hold of your doctor. · It is very important that you keep your follow-up appointment(s). · Please bring discharge papers, medication list (and/or medication bottles) to your follow-up appointments for review by your outpatient provider(s). · Please check the list of medications and be sure it includes every medication (even non-prescription medications) that your provider wants you to take.     · It is important that you take the medication exactly as they are prescribed. · Keep your medication in the bottles provided by the pharmacist and keep a list of the medication names, dosages, and times to be taken in your wallet. · Do not take other medications without consulting your doctor. · If you have any questions about your medications or other instructions, please talk to your nurse or care provider before you leave the hospital.    I understand that if any problems occur once I am at home I am to contact my physician. These instructions were explained to me and I had the opportunity to ask questions.

## 2017-09-06 NOTE — INTERDISCIPLINARY ROUNDS
IDR/SLIDR Summary          Patient: Sandra Herrera MRN: [id number]    Age: 67 y.o. YOB: 1945 Room/Bed: [address] [place] [address]   Admit Diagnosis: Acute hyperkalemia  Inability to walk  Hypotension  Pulmonary edema  Orthostatic hypotension  Principal Diagnosis: Orthostatic hypotension   Goals: stable HR, discharge  Readmission: NO  Quality Measure: CHF  VTE Prophylaxis: Mechanical and Chemical  Influenza Vaccine screening completed? YES  Pneumococcal Vaccine screening completed? YES  Mobility needs: Yes   Nutrition plan:Yes  Consults:P.T, O.T. and Case Management    Financial concerns:Yes  Escalated to CM? YES  RRAT Score: 15   Interventions:H2H  Testing due for pt today? NO  LOS: 5 days Expected length of stay ?  days  Discharge plan: tbd   PCP: Justen Bermeo MD  Transportation needs: Yes    Days before discharge:one day until discharge   Discharge disposition: Home    Signed:     Parth Lynn RN  9/5/2017  8:00 PM

## 2017-09-06 NOTE — PROGRESS NOTES
Cardiology Progress Note            Admit Date: 8/30/2017  Admit Diagnosis: Acute hyperkalemia  Inability to walk  Hypotension  Pulmonary edema  Orthostatic hypotension  Date: 9/6/2017     Time: 10:28 AM    Subjective:  About the same today. No more Afib with associated CP. Assessment and Plan     1. POTS   - Continue Midodrine and Florinef as ordered   - recommend fluids as bolus   - Follow up at [facility]  2. PAF   - Arrhythmia yesterday was Afib with LBBB   - BiV AICD interrogation this am   - Amiodarone 200 mg BID for Afib control  3. CAD   - s/p stents   - ASA only. No statin d/t patient factors  4. CHF   - No BB or ACE 2/2 low BP   - Normal EF on last echo    BiV interrogation showing 82 episodes of Afib. Amiodarone now 200 mg BID. Rhythm stable overnight on telemetry. Symptoms of POTS still exist, perhaps to a lesser degree. Appropriate for discharge and follow up at [facility] for continued POTS treatment. D/w Dr. Nimisha Stapleton. ROS:  A comprehensive review of systems was negative except for that written in the HPI. Objective:      Physical Exam:                Visit Vitals    BP 94/56 (BP Patient Position: At rest)    Pulse 81    Temp 97.6 °F (36.4 °C)    Resp 15    Ht 6' 2\" (1.88 m)    Wt 65.7 kg (144 lb 12.8 oz)    SpO2 100%    BMI 18.59 kg/m2        General Appearance:   Well developed, well nourished,alert and oriented x 3, and   individual in no acute distress. Ears/Nose/Mouth/Throat:    Hearing grossly normal.         Neck:  Supple. Chest:    Lungs clear to auscultation bilaterally. Cardiovascular:   Regular rate and rhythm, S1, S2 normal, no murmur. Abdomen:    Soft, non-tender, bowel sounds are active. Extremities:  No edema bilaterally. Skin:  Warm and dry.      Telemetry: paced          Data Review:    Labs:    Recent Results (from the past 24 hour(s))   METABOLIC PANEL, BASIC    Collection Time: 09/06/17  2:53 AM   Result Value Ref Range    Sodium 142 136 - 145 mmol/L    Potassium 3.3 (L) 3.5 - 5.1 mmol/L    Chloride 106 97 - 108 mmol/L    CO2 26 21 - 32 mmol/L    Anion gap 10 5 - 15 mmol/L    Glucose 94 65 - 100 mg/dL    BUN 17 6 - 20 MG/DL    Creatinine 1.22 0.70 - 1.30 MG/DL    BUN/Creatinine ratio 14 12 - 20      GFR est AA >60 >60 ml/min/1.73m2    GFR est non-AA 58 (L) >60 ml/min/1.73m2    Calcium 8.2 (L) 8.5 - 10.1 MG/DL          Radiology:        Current Facility-Administered Medications   Medication Dose Route Frequency    potassium chloride SR (KLOR-CON 10) tablet 40 mEq  40 mEq Oral Q2H    amiodarone (CORDARONE) tablet 200 mg  200 mg Oral BID    midodrine (PROAMITINE) tablet 15 mg  15 mg Oral TID WITH MEALS    fludrocortisone (FLORINEF) tablet 200 mcg  200 mcg Oral QPM    enoxaparin (LOVENOX) injection 40 mg  40 mg SubCUTAneous Q24H    acetaminophen (TYLENOL) tablet 650 mg  650 mg Oral Q4H PRN    famotidine (PEPCID) tablet 20 mg  20 mg Oral BID    tamsulosin (FLOMAX) capsule 0.4 mg  0.4 mg Oral QHS    ondansetron (ZOFRAN) injection 4 mg  4 mg IntraVENous Q6H PRN    fludrocortisone (FLORINEF) tablet 400 mcg  400 mcg Oral DAILY    sodium chloride (NS) flush 5-10 mL  5-10 mL IntraVENous Q8H    sodium chloride (NS) flush 5-10 mL  5-10 mL IntraVENous PRN    aspirin delayed-release tablet 81 mg  81 mg Oral DAILY    levothyroxine (SYNTHROID) tablet 150 mcg  150 mcg Oral ACB    cyanocobalamin (VITAMIN B12) tablet 2,000 mcg  2,000 mcg Oral DAILY    primidone (MYSOLINE) tablet 250 mg  250 mg Oral BID    hydrocortisone (CORTAID) 1 % cream   Topical TID PRN          Barak Golsdmith PA-C     [facility] [address] [place] [address] [facility] [address] [facility] [address]   [facility]   [phone]

## 2017-09-06 NOTE — DISCHARGE SUMMARY
Discharge Summary     Patient:  Pete Donald       MRN: [id number]       YOB: 1945       Age: 67 y.o. Date of admission:  8/30/2017    Date of discharge:  9/6/2017    Primary care provider: Dr. Saint Leavens, MD    Admitting provider:  Sincere Sanchez MD    Discharging provider:  Aliya Goss MD - [phone]  If unavailable, call [phone] and ask the  to page the triage hospitalist.    Consultations  · IP CONSULT TO HOSPITALIST  · IP CONSULT TO NEUROLOGY  · IP CONSULT TO CARDIOLOGY  · IP CONSULT TO CARDIOLOGY    Procedures  · * No surgery found *    Discharge destination: HOME. The patient is stable for discharge. Admission diagnosis  · Acute hyperkalemia  · Inability to walk  · Hypotension  · Pulmonary edema  · Orthostatic hypotension    Current Discharge Medication List      CONTINUE these medications which have CHANGED    Details   amiodarone (CORDARONE) 200 mg tablet Take 1 Tab by mouth two (2) times a day. Qty: 60 Tab, Refills: 1      !! fludrocortisone (FLORINEF) 0.1 mg tablet Take 2 Tabs by mouth every evening. Qty: 30 Tab, Refills: 1      !! fludrocortisone (FLORINEF) 0.1 mg tablet Take 4 Tabs by mouth daily. Qty: 120 Tab, Refills: 1      midodrine (PROAMITINE) 10 mg tablet Take 1.5 Tabs by mouth three (3) times daily. Qty: 130 Tab, Refills: 1       !! - Potential duplicate medications found. Please discuss with provider. CONTINUE these medications which have NOT CHANGED    Details   aspirin delayed-release 81 mg tablet Take 81 mg by mouth daily. levothyroxine (SYNTHROID) 150 mcg tablet Take 150 mcg by mouth Daily (before breakfast). nitroglycerin (NITROSTAT) 0.4 mg SL tablet 0.4 mg by SubLINGual route every five (5) minutes as needed for Chest Pain. primidone (MYSOLINE) 250 mg tablet Take 250 mg by mouth two (2) times a day.       tamsulosin (FLOMAX) 0.4 mg capsule Take 0.4 mg by mouth nightly. cyanocobalamin 1,000 mcg tablet Take 2,000 mcg by mouth daily. Follow-up Information     Follow up With Details Comments Contact Surjit Aldrich MD In 2 weeks [facility] [address]      Maggie Mitchell MD In 2 weeks discharge follow up  [address] [facility] [address] [facility]  [phone]            Final discharge diagnoses and brief hospital course  Please also refer to the admission H&P for details on the presenting problem. 67 yom with pmh of CAD, MI, afib, s/p PPM, PTCA, stent,heart failure, stroke, UTI, orthostatic hypotension, Hodgkin lymphoma s/p chemo, diverticulitis, hemorrhoids, heart murmur, GI bleed, tremors who present from Dr Saumya Lacey [facility] nephrolog[facility]) for hypotension. BP dropped to 75/40 in office so he was sent to ED for further eval. Patient has h/o orthostatic hypotension for >4 yrs and has had extensive w/u with cardiology, neurology, and nephrology at [facility]. Chest pain:  -now resolved  -seems atypical for ischemia but with a history of CAD  -Appreciate Cardiology,   -no need for V/Q     Postural Orthostatic Tachycardia syndrome;   -Continue home Midodrine and Florinef has been increased to 0.4mg in AM and 0.2mg PM   - may need outpt IV infusion boluses with PCP  -Pulmonary edema on CXR on admission.   -Patient refused for thigh high RODRIGO hose, patient does not want to use it. Patient said he does not tolerate sodium tab.    -echo ef 55-60% no wall abnormalities  -Still dizzy, plans per cardiology but not sure if the patient would ever be symptom free  - midodrine increased to 15mg TID      Hypothyroidism   -tsh wnl   -continue home levothyroxine      Difficulty with ambulating   -Continue PT/OT as he tolerates.       Resting tremor  -resume home meds      Fluid/Electrolytes/Nutirotion:   Will replace potassium and monitor.    Patient said he does not tolerate sodium salt.       Paroxysmal a-fib with ICD  -On Amiodarone at home  -Appreciate Cardiology  - overnight pacing out with AFib and LBBB  - Interrogate pacer today, Amiodarone increased to 200mg BID     CHF, chronic ?diastolic  -Not on BB/ACEi sec to low BP  -s/p ICD, s/p Interrogation    Physical examination at discharge  Visit Vitals    BP 94/56    Pulse 81    Temp 97.6 °F (36.4 °C)    Resp 15    Ht 6' 2\" (1.88 m)    Wt 65.7 kg (144 lb 12.8 oz)    SpO2 100%    BMI 18.59 kg/m2     AO3, frail looking male,   Temporal wasting  Lung: CTA  CVS: RRR  Abd: soft NT ND  Ext: No edeam    Pertinent imaging studies:  ECHO  SUMMARY:  Left ventricle: Systolic function was normal. Ejection fraction was  estimated in the range of 55 % to 60 %. There were no regional wall motion  abnormalities. Left atrium: The atrium was mildly dilated. Mitral valve: There was mild to moderate annular calcification. Recent Labs      09/04/17   0314   WBC  5.5   HGB  11.4*   HCT  34.1*   PLT  172     Recent Labs      09/06/17   0253  09/05/17   0543  09/04/17   0314   NA  142   --   142   K  3.3*   --   3.4*   CL  106   --   108   CO2  26   --   27   BUN  17   --   13   CREA  1.22   --   0.98   GLU  94   --   101*   CA  8.2*   --   8.2*   MG   --   1.9   --    PHOS   --   2.7   --      Recent Labs      09/04/17   0314   SGOT  14*   AP  78   TP  6.6   ALB  3.1*   GLOB  3.5     No results for input(s): INR, PTP, APTT in the last 72 hours. No lab exists for component: INREXT   No results for input(s): FE, TIBC, PSAT, FERR in the last 72 hours. No results for input(s): PH, PCO2, PO2 in the last 72 hours. No results for input(s): CPK, CKMB in the last 72 hours.     No lab exists for component: TROPONINI  No components found for: [place]    Chronic Diagnoses:    Problem List as of 9/6/2017  Date Reviewed: 8/31/2017          Codes Class Noted - Resolved    Acute hyperkalemia ICD-10-CM: E87.5  ICD-9-CM: 276.7  8/31/2017 - Present Pulmonary edema ICD-10-CM: J81.1  ICD-9-CM: 457  8/31/2017 - Present        * (Principal)Orthostatic hypotension ICD-10-CM: I95.1  ICD-9-CM: 458.0  8/31/2017 - Present        Gait instability ICD-10-CM: R26.81  ICD-9-CM: 781.2  8/31/2017 - Present              Time spent on discharge related activities today greater than 30 minutes.       Signed:  Johanny Ansari MD                 Hospitalist, Internal Medicine      Cc: Lupe Ovalles MD

## 2017-09-06 NOTE — PROGRESS NOTES
Received consult to assist patient with medications. CM met with patient and per patient he has no prescription benefit, he states he \"just pays for his medications\". CM provided patient with coupons from [employer] for Amnioderone and Midodrine from several different pharmacies. Order for physical therapy noted, patient does not want anyone coming to his home, so prescription for outpatient therapy was given to patient.  Mick Delacruz RN,CRM

## 2017-09-06 NOTE — PROGRESS NOTES
Bedside and Verbal shift change report given to Katrin Lyman by [employer]. Report included the following information SBAR, Kard[place], [place], Recent Results and Cardiac Rhythm Paced. 0830: Pt discharged.

## 2017-09-06 NOTE — ADVANCED PRACTICE NURSE
Cardiology Progress Note            Admit Date: 8/30/2017  Admit Diagnosis: Acute hyperkalemia  Inability to walk  Hypotension  Pulmonary edema  Orthostatic hypotension  Date: 9/6/2017     Time: 10:28 AM    Subjective:  About the same today. No more Afib with associated CP. Assessment and Plan     1. POTS   - Continue Midodrine and Florinef as ordered   - recommend fluids as bolus   - Follow up at [facility]  2. PAF   - Arrhythmia yesterday was Afib with LBBB   - BiV AICD interrogation this am   - Amiodarone 200 mg BID for Afib control  3. CAD   - s/p stents   - ASA only. No statin d/t patient factors  4. CHF   - No BB or ACE 2/2 low BP   - Normal EF on last echo    BiV interrogation showing 82 episodes of Afib. Amiodarone now 200 mg BID. Rhythm stable overnight on telemetry. Symptoms of POTS still exist, perhaps to a lesser degree. Appropriate for discharge and follow up at [facility] for continued POTS treatment. D/w Dr. Webb Barthel. Patient discharged before rounds with Dr. Ashley De Leon.  Did d/w Dr. Ashley De Leon plans. ROS:  A comprehensive review of systems was negative except for that written in the HPI. Objective:      Physical Exam:                Visit Vitals    BP 94/56 (BP Patient Position: At rest)    Pulse 81    Temp 97.6 °F (36.4 °C)    Resp 15    Ht 6' 2\" (1.88 m)    Wt 65.7 kg (144 lb 12.8 oz)    SpO2 100%    BMI 18.59 kg/m2        General Appearance:   Well developed, well nourished,alert and oriented x 3, and   individual in no acute distress. Ears/Nose/Mouth/Throat:    Hearing grossly normal.         Neck:  Supple. Chest:    Lungs clear to auscultation bilaterally. Cardiovascular:   Regular rate and rhythm, S1, S2 normal, no murmur. Abdomen:    Soft, non-tender, bowel sounds are active. Extremities:  No edema bilaterally. Skin:  Warm and dry.      Telemetry: paced          Data Review:    Labs:    Recent Results (from the past 24 hour(s))   METABOLIC PANEL, BASIC    Collection Time: 09/06/17  2:53 AM   Result Value Ref Range    Sodium 142 136 - 145 mmol/L    Potassium 3.3 (L) 3.5 - 5.1 mmol/L    Chloride 106 97 - 108 mmol/L    CO2 26 21 - 32 mmol/L    Anion gap 10 5 - 15 mmol/L    Glucose 94 65 - 100 mg/dL    BUN 17 6 - 20 MG/DL    Creatinine 1.22 0.70 - 1.30 MG/DL    BUN/Creatinine ratio 14 12 - 20      GFR est AA >60 >60 ml/min/1.73m2    GFR est non-AA 58 (L) >60 ml/min/1.73m2    Calcium 8.2 (L) 8.5 - 10.1 MG/DL          Radiology:        Current Facility-Administered Medications   Medication Dose Route Frequency    potassium chloride SR (KLOR-CON 10) tablet 40 mEq  40 mEq Oral Q2H    amiodarone (CORDARONE) tablet 200 mg  200 mg Oral BID    midodrine (PROAMITINE) tablet 15 mg  15 mg Oral TID WITH MEALS    fludrocortisone (FLORINEF) tablet 200 mcg  200 mcg Oral QPM    enoxaparin (LOVENOX) injection 40 mg  40 mg SubCUTAneous Q24H    acetaminophen (TYLENOL) tablet 650 mg  650 mg Oral Q4H PRN    famotidine (PEPCID) tablet 20 mg  20 mg Oral BID    tamsulosin (FLOMAX) capsule 0.4 mg  0.4 mg Oral QHS    ondansetron (ZOFRAN) injection 4 mg  4 mg IntraVENous Q6H PRN    fludrocortisone (FLORINEF) tablet 400 mcg  400 mcg Oral DAILY    sodium chloride (NS) flush 5-10 mL  5-10 mL IntraVENous Q8H    sodium chloride (NS) flush 5-10 mL  5-10 mL IntraVENous PRN    aspirin delayed-release tablet 81 mg  81 mg Oral DAILY    levothyroxine (SYNTHROID) tablet 150 mcg  150 mcg Oral ACB    cyanocobalamin (VITAMIN B12) tablet 2,000 mcg  2,000 mcg Oral DAILY    primidone (MYSOLINE) tablet 250 mg  250 mg Oral BID    hydrocortisone (CORTAID) 1 % cream   Topical TID PRN          Susanna Patel PA-C     [facility] [address] [facility] [place] [address] [facility] [place] [address]   [address] [facility]   [phone]

## 2017-09-08 ENCOUNTER — PATIENT OUTREACH (OUTPATIENT)
Dept: CARDIOLOGY CLINIC | Age: 72
End: 2017-09-08

## 2017-09-08 LAB
ANTI-HU AB: NORMAL TITER
ANTI-RI AB: NORMAL TITER

## 2017-09-08 NOTE — PROGRESS NOTES
NN note:    Patient admitted to [facility] 8/30-9/6 for orthostatic hypotension. Called and spoke with patient who informed me he was currently admitted at [facility] after leaving [facility] and going straight to Dr. [employer] office. Patient stated his BP was 60/30 and they immediately admitted him. He has a scheduled ablation for Monday 9/11. Patient states that he is usually a patient of [facility] but was sent to [facility] but he will resume all of his care back at [facility]. Reached out to Dr. Harpreet Wick office and [facility] for nurse to see if she needed me to fax her any records. Informed Dr. Nazanin Diehl of the above. Gave patient my contact information for any needs.

## 2017-09-11 LAB
GM1 AB, IGM: NORMAL TITER
GM1 IGG, GM1GT: NORMAL TITER